# Patient Record
Sex: FEMALE | Race: WHITE | NOT HISPANIC OR LATINO | Employment: PART TIME
[De-identification: names, ages, dates, MRNs, and addresses within clinical notes are randomized per-mention and may not be internally consistent; named-entity substitution may affect disease eponyms.]

---

## 2017-02-27 DIAGNOSIS — F40.243 FEAR OF FLYING: ICD-10-CM

## 2017-02-27 RX ORDER — LORAZEPAM 0.5 MG/1
TABLET ORAL
Qty: 30 TABLET | Refills: 0
Start: 2017-02-27 | End: 2017-09-26

## 2017-02-27 NOTE — TELEPHONE ENCOUNTER
HAs used 30 tabs total in 10+ months. OK for refill. PLease call in RF to pharmacy. Pt last seen in April 2016. Pt to f/u with me this April for annual review blood pressure control, etc. Inform pt

## 2017-02-27 NOTE — TELEPHONE ENCOUNTER
LORazepam (ATIVAN) 0.5 MG tablet      Last Written Prescription Date:  4/29/2016  Last Fill Quantity: 30,   # refills: 0  Last Office Visit with [facility], [facility] or [facility] prescribing provider: 4/29/2016  Future Office visit:       Routing refill request to provider for review/approval because:  Drug not on the [facility], [facility] or [facility] refill protocol or controlled substance

## 2017-02-27 NOTE — LETTER
[facility]  [address] [address]  [phone] (appt)  [phone] (nurse line)    February 28, 2017      Padmini Alan                                                                                                                    [employer] [place] [id number]              Dear Padmini,    In reviewing your recent refill request for Lorazepam, it was noted that you are due for the following:     Follow-up appointment with your physician in April.    Approval for a 30-day supply of the medication has been sent to your pharmacy.  Additional refills will be approved during your follow up visit.     Please contact our office at [phone] to schedule your doctor's appointment.          Sincerely,      [facility]

## 2017-02-27 NOTE — TELEPHONE ENCOUNTER
Reason for Call:  Medication or medication refill:    Do you use a [facility]?  Name of the pharmacy and phone number for the current request:  [facility] [address] - [phone]    Name of the medication requested: Lorazapam    Other request: Please walk to script to [facility]    Can we leave a detailed message on this number? YES    Phone number patient can be reached at: Home number on file [phone] (home)    Best Time: anytime    Call taken on 2/27/2017 at 8:41 AM by PHILIPP GOINS

## 2017-03-28 DIAGNOSIS — Z12.11 SPECIAL SCREENING FOR MALIGNANT NEOPLASMS, COLON: Primary | ICD-10-CM

## 2017-04-21 DIAGNOSIS — I10 ESSENTIAL HYPERTENSION: ICD-10-CM

## 2017-04-21 RX ORDER — LISINOPRIL AND HYDROCHLOROTHIAZIDE 12.5; 2 MG/1; MG/1
TABLET ORAL
Qty: 30 TABLET | Refills: 0 | Status: SHIPPED | OUTPATIENT
Start: 2017-04-21 | End: 2017-06-05

## 2017-04-21 NOTE — TELEPHONE ENCOUNTER
Medication is being filled for 1 time refill only due to:  Patient needs labs K and Cr. Patient needs to be seen because it has almost been more than one year since last visit.

## 2017-04-21 NOTE — TELEPHONE ENCOUNTER
lisinopril-hydrochlorothiazide (PRINZIDE,ZESTORETIC) 20-12.5 MG per tablet      Last Written Prescription Date: 4/29/2016  Last Fill Quantity: 90, # refills: 3  Last Office Visit with [facility], [facility] or [facility] prescribing provider: 4/29/2016       Potassium   Date Value Ref Range Status   04/26/2016 3.7 3.4 - 5.3 mmol/L Final     Creatinine   Date Value Ref Range Status   04/26/2016 1.00 0.52 - 1.04 mg/dL Final     BP Readings from Last 3 Encounters:   08/25/16 116/70   07/18/16 128/88   04/29/16 126/82

## 2017-05-27 ENCOUNTER — HEALTH MAINTENANCE LETTER (OUTPATIENT)
Age: 57
End: 2017-05-27

## 2017-06-01 DIAGNOSIS — E03.9 HYPOTHYROIDISM, UNSPECIFIED TYPE: ICD-10-CM

## 2017-06-01 RX ORDER — LEVOTHYROXINE SODIUM 112 UG/1
TABLET ORAL
Qty: 30 TABLET | Refills: 0 | Status: SHIPPED | OUTPATIENT
Start: 2017-06-01 | End: 2017-06-16

## 2017-06-05 DIAGNOSIS — I10 ESSENTIAL HYPERTENSION: ICD-10-CM

## 2017-06-06 DIAGNOSIS — E03.9 HYPOTHYROIDISM, UNSPECIFIED TYPE: ICD-10-CM

## 2017-06-06 DIAGNOSIS — I10 ESSENTIAL HYPERTENSION: ICD-10-CM

## 2017-06-06 LAB
ANION GAP SERPL CALCULATED.3IONS-SCNC: 9 MMOL/L (ref 3–14)
BUN SERPL-MCNC: 16 MG/DL (ref 7–30)
CALCIUM SERPL-MCNC: 10 MG/DL (ref 8.5–10.1)
CHLORIDE SERPL-SCNC: 107 MMOL/L (ref 94–109)
CO2 SERPL-SCNC: 25 MMOL/L (ref 20–32)
CREAT SERPL-MCNC: 0.91 MG/DL (ref 0.52–1.04)
GFR SERPL CREATININE-BSD FRML MDRD: 63 ML/MIN/1.7M2
GLUCOSE SERPL-MCNC: 99 MG/DL (ref 70–99)
POTASSIUM SERPL-SCNC: 3.9 MMOL/L (ref 3.4–5.3)
SODIUM SERPL-SCNC: 141 MMOL/L (ref 133–144)
TSH SERPL DL<=0.005 MIU/L-ACNC: 2.05 MU/L (ref 0.4–4)

## 2017-06-06 PROCEDURE — 84443 ASSAY THYROID STIM HORMONE: CPT | Performed by: INTERNAL MEDICINE

## 2017-06-06 PROCEDURE — 80048 BASIC METABOLIC PNL TOTAL CA: CPT | Performed by: INTERNAL MEDICINE

## 2017-06-06 PROCEDURE — 36415 COLL VENOUS BLD VENIPUNCTURE: CPT | Performed by: INTERNAL MEDICINE

## 2017-06-06 RX ORDER — LISINOPRIL AND HYDROCHLOROTHIAZIDE 12.5; 2 MG/1; MG/1
TABLET ORAL
Qty: 30 TABLET | Refills: 0 | Status: SHIPPED | OUTPATIENT
Start: 2017-06-06 | End: 2017-07-04

## 2017-06-16 ENCOUNTER — OFFICE VISIT (OUTPATIENT)
Dept: INTERNAL MEDICINE | Facility: CLINIC | Age: 57
End: 2017-06-16
Payer: COMMERCIAL

## 2017-06-16 VITALS
TEMPERATURE: 98.6 F | BODY MASS INDEX: 42.26 KG/M2 | HEART RATE: 97 BPM | SYSTOLIC BLOOD PRESSURE: 118 MMHG | WEIGHT: 252 LBS | DIASTOLIC BLOOD PRESSURE: 70 MMHG | OXYGEN SATURATION: 96 %

## 2017-06-16 DIAGNOSIS — Z12.11 SPECIAL SCREENING FOR MALIGNANT NEOPLASMS, COLON: ICD-10-CM

## 2017-06-16 DIAGNOSIS — E66.01 MORBID OBESITY, UNSPECIFIED OBESITY TYPE (H): ICD-10-CM

## 2017-06-16 DIAGNOSIS — E03.9 HYPOTHYROIDISM, UNSPECIFIED TYPE: ICD-10-CM

## 2017-06-16 PROCEDURE — 99214 OFFICE O/P EST MOD 30 MIN: CPT | Performed by: INTERNAL MEDICINE

## 2017-06-16 RX ORDER — LEVOTHYROXINE SODIUM 112 UG/1
TABLET ORAL
Qty: 30 TABLET | Refills: 6
Start: 2017-06-16 | End: 2017-06-27

## 2017-06-16 RX ORDER — LEVOTHYROXINE SODIUM 125 UG/1
TABLET ORAL
Qty: 30 TABLET | Refills: 6
Start: 2017-06-16 | End: 2017-08-25

## 2017-06-16 NOTE — NURSING NOTE
"Chief Complaint   Patient presents with     Thyroid Problem       Initial /70  Pulse 97  Temp 98.6  F (37  C) (Oral)  Wt 252 lb (114.3 kg)  LMP 12/23/2004  SpO2 96%  BMI 42.26 kg/m2 Estimated body mass index is 42.26 kg/(m^2) as calculated from the following:    Height as of 4/29/16: 5' 4.75\" (1.645 m).    Weight as of this encounter: 252 lb (114.3 kg).  Medication Reconciliation: complete  "

## 2017-06-16 NOTE — PATIENT INSTRUCTIONS
" FIT test  Call when willing to have the colonoscopy  Pap/pelvic exam with me or female staff  Mammogram - [place]  Change Levothyroxine to 112mcg  on odd days and 125mcg on even days  Nonfasting TSH lab in 6 weeks (early August)  Calorie/carbohydrate (sugar, bread, potato, pasta, etc) reduction in diet for weight loss.  Increase color on your plate with fruits and vegetables. Increase  frequency of walking or other aerobic exercise as able (goal is daily). \"Couch aerobics\"  "

## 2017-06-16 NOTE — PROGRESS NOTES
"  SUBJECTIVE:                                                    Padmini Alan is a 57 year old female who presents to clinic today for the following health issues:      Hypothyroidism Follow-up      Since last visit, patient describes the following symptoms: weight gain of 12 lbs and fatigue       Amount of exercise or physical activity: 4-5 days/week for an average of 15-30 minutes    Problems taking medications regularly: No    Medication side effects: none    Diet: regular (no restrictions)      Pt's past medical history, family history, habits, medications and allergies were reviewed with the patient today.  See snap shot for  HCM status. Most recent lab results reviewed with pt. Problem list and histories reviewed & adjusted, as indicated.  Additional history as below:    Has used Lorazepam very rarely with travel, etc.   patient is not exercising currently. Not following any particular type of diet. Has gained 12 pounds since last August. Thyroid levels reviewed and TSH in the middle of normal. Currently on levothyroxine 112  g daily. When he has tried 125  g daily, TSH has been suppressed on lab values. Denies chest pain, shortness of breath, abdominal pain, headache, vision changes or side effects with medications.       Additional ROS:   Constitutional, HEENT, Cardiovascular, Pulmonary, GI and , Neuro, MSK and Psych review of systems/symptoms are otherwise negative or unchanged from previous, except as noted above.      OBJECTIVE:  /70  Pulse 97  Temp 98.6  F (37  C) (Oral)  Wt 252 lb (114.3 kg)  LMP 12/23/2004  SpO2 96%  BMI 42.26 kg/m2   Estimated body mass index is 42.26 kg/(m^2) as calculated from the following:    Height as of 4/29/16: 5' 4.75\" (1.645 m).    Weight as of this encounter: 252 lb (114.3 kg).  Eye: PERRL, EOMI  HENT: ear canals and TM's normal and nose and mouth without ulcers or lesions   Neck: no adenopathy. Thyroid normal to palpation. No bruits  Pulm: Lungs clear to " "auscultation   CV: Regular rates and rhythm  GI: Soft, obese, nontender, Normal active bowel sounds, No hepatosplenomegaly or masses palpable  Ext: Peripheral pulses intact. No edema.  Neuro: Normal strength and tone, sensory exam grossly normal    Assessment/Plan: (See plan discussion below for further details)  1. Hypothyroidism, unspecified type  Will try to tweak up the dose a little bit more to help with metabolism along with lifestyle changes as below to help patient's weight etc.  - TSH with free T4 reflex; Future  - levothyroxine (SYNTHROID/LEVOTHROID) 112 MCG tablet; 1 tab every other day on odd  number days  Dispense: 30 tablet; Refill: 6  - levothyroxine (SYNTHROID/LEVOTHROID) 125 MCG tablet; 1 tab every other day on even number days  Dispense: 30 tablet; Refill: 6    2. Morbid obesity, unspecified obesity type (H)  Calorie/carbohydrate (sugar, bread, potato, pasta, etc) reduction in diet for weight loss.  Increase color on your plate with fruits and vegetables. Increase  frequency of walking or other aerobic exercise as able (goal is daily)    3. Special screening for malignant neoplasms, colon  Refuses colonoscopy despite MD recommendation for the procedure. FIT test ordered. Reviewed the reduced sensitivity of the FIT test for colon cancer screening compared to colonoscopy and pt states understanding of this      Plan discussion:   FIT test  Call when willing to have the colonoscopy  Pap/pelvic exam with me or female staff  Mammogram - [place]  Change Levothyroxine to 112mcg  on odd days and 125mcg on even days  Nonfasting TSH lab in 6 weeks (early August)  Calorie/carbohydrate (sugar, bread, potato, pasta, etc) reduction in diet for weight loss.  Increase color on your plate with fruits and vegetables. Increase  frequency of walking or other aerobic exercise as able (goal is daily). \"Couch aerobics\"       >25 minutes was spent with the patient today with more than 50% of the appointment providing " counseling/education re: obesity/diet & exercise ideas and benefits of cancer screenings as above and coordination of care           Maico Bell MD  Internal Medicine Department  [facility]

## 2017-06-16 NOTE — MR AVS SNAPSHOT
"              After Visit Summary   6/16/2017    Padmini Alan    MRN: [id number]           Patient Information     Date Of Birth          1960        Visit Information        Provider Department      6/16/2017 2:30 PM Maico Bell MD [facility]        Today's Diagnoses     Special screening for malignant neoplasms, colon    -  1    Hypothyroidism, unspecified type          Care Instructions     FIT test  Call when willing to have the colonoscopy  Pap/pelvic exam with me or female staff  Mammogram - [facility]  Change Levothyroxine to 112mcg  on odd days and 125mcg on even days  Nonfasting TSH lab in 6 weeks (early August)  Calorie/carbohydrate (sugar, bread, potato, pasta, etc) reduction in diet for weight loss.  Increase color on your plate with fruits and vegetables. Increase  frequency of walking or other aerobic exercise as able (goal is daily). \"Couch aerobics\"          Follow-ups after your visit        Future tests that were ordered for you today     Open Future Orders        Priority Expected Expires Ordered    TSH with free T4 reflex Routine 7/28/2017 6/16/2018 6/16/2017            Who to contact     If you have questions or need follow up information about today's clinic visit or your schedule please contact [facility] directly at [phone].  Normal or non-critical lab and imaging results will be communicated to you by MyChart, letter or phone within 4 business days after the clinic has received the results. If you do not hear from us within 7 days, please contact the clinic through MyChart or phone. If you have a critical or abnormal lab result, we will notify you by phone as soon as possible.  Submit refill requests through [employer] or call your pharmacy and they will forward the refill request to us. Please allow 3 business days for your refill to be completed.          Additional Information About Your Visit        MyChart Information     MyChart " "lets you send messages to your doctor, view your test results, renew your prescriptions, schedule appointments and more. To sign up, go to [email or web address] . Click on \"Log in\" on the left side of the screen, which will take you to the Welcome page. Then click on \"Sign up Now\" on the right side of the page.     You will be asked to enter the access code listed below, as well as some personal information. Please follow the directions to create your username and password.     Your access code is: [email or web address]  Expires: 2017  3:18 PM     Your access code will  in 90 days. If you need help or a new code, please call your [facility] clinic or [phone].        Care EveryWhere ID     This is your Care EveryWhere ID. This could be used by other organizations to access your [facility] medical records  [id number]        Your Vitals Were     Pulse Temperature Last Period Pulse Oximetry BMI (Body Mass Index)       97 98.6  F (37  C) (Oral) 2004 96% 42.26 kg/m2        Blood Pressure from Last 3 Encounters:   17 118/70   16 116/70   16 128/88    Weight from Last 3 Encounters:   17 252 lb (114.3 kg)   16 240 lb (108.9 kg)   16 239 lb 14.4 oz (108.8 kg)               Primary Care Provider Office Phone # Fax #    Maico Bell [phone]       [facility] [address] [facility] [address]        Thank you!     Thank you for choosing [facility]  for your care. Our goal is always to provide you with excellent care. Hearing back from our patients is one way we can continue to improve our services. Please take a few minutes to complete the written survey that you may receive in the mail after your visit with us. Thank you!             Your Updated Medication List - Protect others around you: Learn how to safely use, store and throw away your medicines at www.disposemymeds.org.          This list is accurate as of: 17  " 3:18 PM.  Always use your most recent med list.                   Brand Name Dispense Instructions for use    fluticasone 50 MCG/ACT spray    FLONASE    16 g    Spray 1-2 sprays into both nostrils daily       levothyroxine 112 MCG tablet    SYNTHROID/LEVOTHROID    30 tablet    TAKE 1 TABLET (112 MCG) BY MOUTH DAILY       lisinopril-hydrochlorothiazide 20-12.5 MG per tablet    PRINZIDE/ZESTORETIC    30 tablet    TAKE ONE TABLET BY MOUTH IN THE MORNING       LORazepam 0.5 MG tablet    ATIVAN    30 tablet    1 tab twice a day as needed for anxiety

## 2017-06-27 DIAGNOSIS — E03.9 HYPOTHYROIDISM, UNSPECIFIED TYPE: ICD-10-CM

## 2017-06-27 RX ORDER — LEVOTHYROXINE SODIUM 112 UG/1
TABLET ORAL
Qty: 30 TABLET | Refills: 0 | OUTPATIENT
Start: 2017-06-27

## 2017-06-27 RX ORDER — LEVOTHYROXINE SODIUM 112 UG/1
TABLET ORAL
Qty: 30 TABLET | Refills: 11 | Status: SHIPPED | OUTPATIENT
Start: 2017-06-27 | End: 2018-08-09

## 2017-07-04 DIAGNOSIS — I10 ESSENTIAL HYPERTENSION: ICD-10-CM

## 2017-07-06 RX ORDER — LISINOPRIL AND HYDROCHLOROTHIAZIDE 12.5; 2 MG/1; MG/1
TABLET ORAL
Qty: 30 TABLET | Refills: 10 | Status: SHIPPED | OUTPATIENT
Start: 2017-07-06 | End: 2018-05-02

## 2017-07-06 NOTE — TELEPHONE ENCOUNTER
Prescription approved per [facility] Refill Protocol.

## 2017-08-04 ENCOUNTER — TELEPHONE (OUTPATIENT)
Dept: INTERNAL MEDICINE | Facility: CLINIC | Age: 57
End: 2017-08-04

## 2017-08-05 NOTE — TELEPHONE ENCOUNTER
8/4/2017    Call Regarding Preventive Health Screening Colonoscopy, Mammogram and Cervical/PAP    Attempt 1    Message on voicemail     Comments:       Outreach   Mireya Goldstein

## 2017-08-09 DIAGNOSIS — E03.9 HYPOTHYROIDISM, UNSPECIFIED TYPE: ICD-10-CM

## 2017-08-09 DIAGNOSIS — I10 ESSENTIAL HYPERTENSION: Primary | ICD-10-CM

## 2017-08-09 LAB — TSH SERPL DL<=0.005 MIU/L-ACNC: 0.9 MU/L (ref 0.4–4)

## 2017-08-09 PROCEDURE — 84443 ASSAY THYROID STIM HORMONE: CPT | Performed by: INTERNAL MEDICINE

## 2017-08-09 PROCEDURE — 36415 COLL VENOUS BLD VENIPUNCTURE: CPT | Performed by: INTERNAL MEDICINE

## 2017-08-21 NOTE — TELEPHONE ENCOUNTER
8/21/2017    Call Regarding Preventive Health Screening VIP Mammogram    Attempt 1    Message on voicemail     Comments: VIP Mammogram Patient    Other screenings due: PAP, COLONOSCOPY        Outreach   [facility]

## 2017-08-25 DIAGNOSIS — E03.9 HYPOTHYROIDISM, UNSPECIFIED TYPE: ICD-10-CM

## 2017-08-25 NOTE — TELEPHONE ENCOUNTER
Levothyroxine rx no print out, don't think pharmacy got it     Last Written Prescription Date: 06/16/17  Last Quantity: 30, # refills: 6  Last Office Visit with [facility], [facility] or [facility] prescribing provider: 06/16/17        TSH   Date Value Ref Range Status   08/09/2017 0.90 0.40 - 4.00 mU/L Final

## 2017-08-29 RX ORDER — LEVOTHYROXINE SODIUM 125 UG/1
TABLET ORAL
Qty: 90 TABLET | Refills: 2 | Status: SHIPPED | OUTPATIENT
Start: 2017-08-29 | End: 2018-05-18

## 2017-08-31 NOTE — TELEPHONE ENCOUNTER
8/31/2017    Call Regarding Preventive Health Screening VIP Mammogram    Attempt 1    Message on voicemail     Comments: VIP Mammogram Patient    Other screenings due: PAP, COLONOSCOPY        Outreach   AT

## 2017-09-26 DIAGNOSIS — F40.243 FEAR OF FLYING: ICD-10-CM

## 2017-09-26 NOTE — TELEPHONE ENCOUNTER
Lorazepam      Last Written Prescription Date:  02/27/17  Last Fill Quantity: 30,   # refills: 0  Last Office Visit with [facility], [facility] or [facility] prescribing provider: 06/16/17  Future Office visit:       Routing refill request to provider for review/approval because:  Drug is a controlled substance

## 2017-09-27 RX ORDER — LORAZEPAM 0.5 MG/1
TABLET ORAL
Qty: 30 TABLET | Refills: 0 | Status: SHIPPED | OUTPATIENT
Start: 2017-09-27 | End: 2018-03-27

## 2017-09-27 NOTE — TELEPHONE ENCOUNTER
Has used 30 tabs in 7 mos. Use appropriate. Printed Rx in [place] basket. Please fax or call into pharmacy

## 2018-03-27 DIAGNOSIS — F40.243 FEAR OF FLYING: ICD-10-CM

## 2018-03-27 RX ORDER — LORAZEPAM 0.5 MG/1
TABLET ORAL
Qty: 30 TABLET | Refills: 0 | Status: SHIPPED | OUTPATIENT
Start: 2018-03-27 | End: 2018-08-09

## 2018-03-27 NOTE — TELEPHONE ENCOUNTER
Requested Prescriptions   Pending Prescriptions Disp Refills     LORazepam (ATIVAN) 0.5 MG tablet 30 tablet 0     Si tab twice a day as needed for anxiety    There is no refill protocol information for this order        Last Written Prescription Date:  17  Last Fill Quantity: 30,  # refills: 0   Last office visit: 2017 with prescribing provider:  Ray Minaya Office Visit:

## 2018-05-02 DIAGNOSIS — I10 ESSENTIAL HYPERTENSION: ICD-10-CM

## 2018-05-02 RX ORDER — LISINOPRIL AND HYDROCHLOROTHIAZIDE 12.5; 2 MG/1; MG/1
TABLET ORAL
Qty: 30 TABLET | Refills: 0 | Status: SHIPPED | OUTPATIENT
Start: 2018-05-02 | End: 2018-05-31

## 2018-05-02 NOTE — TELEPHONE ENCOUNTER
"Requested Prescriptions   Pending Prescriptions Disp Refills     lisinopril-hydrochlorothiazide (PRINZIDE/ZESTORETIC) 20-12.5 MG per tablet [Pharmacy Med Name: LISINOPRIL-HCTZ 20-12.5 MG TAB] 30 tablet 10     Sig: TAKE ONE TABLET BY MOUTH IN THE MORNING    Diuretics (Including Combos) Protocol Passed    5/2/2018  1:40 AM       Passed - Blood pressure under 140/90 in past 12 months    BP Readings from Last 3 Encounters:   06/16/17 118/70   08/25/16 116/70   07/18/16 128/88                Passed - Recent (12 mo) or future (30 days) visit within the authorizing provider's specialty    Patient had office visit in the last 12 months or has a visit in the next 30 days with authorizing provider or within the authorizing provider's specialty.  See \"Patient Info\" tab in inbasket, or \"Choose Columns\" in Meds & Orders section of the refill encounter.           Passed - Patient is age 18 or older       Passed - No active pregancy on record       Passed - Normal serum creatinine on file in past 12 months    Recent Labs   Lab Test  06/06/17   0958   CR  0.91             Passed - Normal serum potassium on file in past 12 months    Recent Labs   Lab Test  06/06/17   0958   POTASSIUM  3.9                   Passed - Normal serum sodium on file in past 12 months    Recent Labs   Lab Test  06/06/17   0958   NA  141             Passed - No positive pregnancy test in past 12 months        Prescription approved per [facility] Refill Protocol.    "

## 2018-05-18 DIAGNOSIS — E03.9 HYPOTHYROIDISM, UNSPECIFIED TYPE: ICD-10-CM

## 2018-05-18 NOTE — TELEPHONE ENCOUNTER
"Requested Prescriptions   Pending Prescriptions Disp Refills     levothyroxine (SYNTHROID/LEVOTHROID) 125 MCG tablet [Pharmacy Med Name: LEVOTHYROXINE 125 MCG TABLET]  Last Written Prescription Date:  8/29/2017  Last Fill Quantity: 90 tablet,  # refills: 2   Last Office Visit: 6/16/2017   Future Office Visit:      90 tablet 2     Sig: TAKE ONE TABLET BY MOUTH ONE TIME DAILY    Thyroid Protocol Passed    5/18/2018 12:58 AM       Passed - Patient is 12 years or older       Passed - Recent (12 mo) or future (30 days) visit within the authorizing provider's specialty    Patient had office visit in the last 12 months or has a visit in the next 30 days with authorizing provider or within the authorizing provider's specialty.  See \"Patient Info\" tab in inbasket, or \"Choose Columns\" in Meds & Orders section of the refill encounter.           Passed - Normal TSH on file in past 12 months    Recent Labs   Lab Test  08/09/17   1331   TSH  0.90             Passed - No active pregnancy on record    If patient is pregnant or has had a positive pregnancy test, please check TSH.         Passed - No positive pregnancy test in past 12 months    If patient is pregnant or has had a positive pregnancy test, please check TSH.            "

## 2018-05-18 NOTE — TELEPHONE ENCOUNTER
"Requested Prescriptions   Pending Prescriptions Disp Refills     levothyroxine (SYNTHROID/LEVOTHROID) 125 MCG tablet [Pharmacy Med Name: LEVOTHYROXINE 125 MCG TABLET]  Last Written Prescription Date:  8/29/2017  Last Fill Quantity: 90,  # refills: 2   Last office visit: 6/16/2017 with prescribing provider:  6/16/2017   Future Office Visit:     90 tablet 2     Sig: TAKE ONE TABLET BY MOUTH ONE TIME DAILY    Thyroid Protocol Passed    5/18/2018 12:58 AM       Passed - Patient is 12 years or older       Passed - Recent (12 mo) or future (30 days) visit within the authorizing provider's specialty    Patient had office visit in the last 12 months or has a visit in the next 30 days with authorizing provider or within the authorizing provider's specialty.  See \"Patient Info\" tab in inbasket, or \"Choose Columns\" in Meds & Orders section of the refill encounter.           Passed - Normal TSH on file in past 12 months    Recent Labs   Lab Test  08/09/17   1331   TSH  0.90             Passed - No active pregnancy on record    If patient is pregnant or has had a positive pregnancy test, please check TSH.         Passed - No positive pregnancy test in past 12 months    If patient is pregnant or has had a positive pregnancy test, please check TSH.            "

## 2018-05-21 RX ORDER — LEVOTHYROXINE SODIUM 125 UG/1
TABLET ORAL
Qty: 90 TABLET | Refills: 0 | Status: SHIPPED | OUTPATIENT
Start: 2018-05-21 | End: 2018-08-09

## 2018-05-31 DIAGNOSIS — I10 ESSENTIAL HYPERTENSION: ICD-10-CM

## 2018-05-31 RX ORDER — LISINOPRIL AND HYDROCHLOROTHIAZIDE 12.5; 2 MG/1; MG/1
TABLET ORAL
Qty: 30 TABLET | Refills: 0 | Status: SHIPPED | OUTPATIENT
Start: 2018-05-31 | End: 2018-07-29

## 2018-05-31 NOTE — TELEPHONE ENCOUNTER
"Requested Prescriptions   Pending Prescriptions Disp Refills     lisinopril-hydrochlorothiazide (PRINZIDE/ZESTORETIC) 20-12.5 MG per tablet [Pharmacy Med Name: LISINOPRIL-HCTZ 20-12.5 MG TAB] 30 tablet 0     Sig: TAKE ONE TABLET BY MOUTH IN THE MORNING    Diuretics (Including Combos) Protocol Passed    5/31/2018  1:29 AM       Passed - Blood pressure under 140/90 in past 12 months    BP Readings from Last 3 Encounters:   06/16/17 118/70   08/25/16 116/70   07/18/16 128/88                Passed - Recent (12 mo) or future (30 days) visit within the authorizing provider's specialty    Patient had office visit in the last 12 months or has a visit in the next 30 days with authorizing provider or within the authorizing provider's specialty.  See \"Patient Info\" tab in inbasket, or \"Choose Columns\" in Meds & Orders section of the refill encounter.           Passed - Patient is age 18 or older       Passed - No active pregancy on record       Passed - Normal serum creatinine on file in past 12 months    Recent Labs   Lab Test  06/06/17   0958   CR  0.91             Passed - Normal serum potassium on file in past 12 months    Recent Labs   Lab Test  06/06/17   0958   POTASSIUM  3.9                   Passed - Normal serum sodium on file in past 12 months    Recent Labs   Lab Test  06/06/17   0958   NA  141             Passed - No positive pregnancy test in past 12 months        Prescription approved per [facility] Refill Protocol.    "

## 2018-07-29 DIAGNOSIS — I10 ESSENTIAL HYPERTENSION: ICD-10-CM

## 2018-07-29 NOTE — LETTER
[facility]  [address] [id number] [phone]            Padmini Alan  [facility] [facility] [address] [facility] [address]        July 31, 2018    Dear Padmini,    While refilling your prescription today, we noticed that you are due for an appointment with your provider.  We will refill your prescription for 30 days, but a follow-up appointment must be made before any additional refills can be approved.     Taking care of your health is important to us and we look forward to seeing you in the near future.  Please call us at [phone] or [phone] (or use [employer]) to schedule an appointment.     Please disregard this notice if you have already made an appointment.    Sincerely,        [facility]

## 2018-07-31 RX ORDER — LISINOPRIL AND HYDROCHLOROTHIAZIDE 12.5; 2 MG/1; MG/1
TABLET ORAL
Qty: 30 TABLET | Refills: 0 | Status: SHIPPED | OUTPATIENT
Start: 2018-07-31 | End: 2018-08-09

## 2018-07-31 NOTE — TELEPHONE ENCOUNTER
"Requested Prescriptions   Pending Prescriptions Disp Refills     lisinopril-hydrochlorothiazide (PRINZIDE/ZESTORETIC) 20-12.5 MG per tablet [Pharmacy Med Name: LISINOPRIL-HCTZ 20-12.5 MG TAB] 30 tablet 0     Sig: TAKE ONE TABLET BY MOUTH IN THE MORNING    Diuretics (Including Combos) Protocol Failed    7/29/2018  1:25 AM       Failed - Blood pressure under 140/90 in past 12 months    BP Readings from Last 3 Encounters:   06/16/17 118/70   08/25/16 116/70   07/18/16 128/88                Failed - Recent (12 mo) or future (30 days) visit within the authorizing provider's specialty    Patient had office visit in the last 12 months or has a visit in the next 30 days with authorizing provider or within the authorizing provider's specialty.  See \"Patient Info\" tab in inbasket, or \"Choose Columns\" in Meds & Orders section of the refill encounter.           Failed - Normal serum creatinine on file in past 12 months    Recent Labs   Lab Test  06/06/17   0958   CR  0.91             Failed - Normal serum potassium on file in past 12 months    Recent Labs   Lab Test  06/06/17   0958   POTASSIUM  3.9                   Failed - Normal serum sodium on file in past 12 months    Recent Labs   Lab Test  06/06/17   0958   NA  141             Passed - Patient is age 18 or older       Passed - No active pregancy on record       Passed - No positive pregnancy test in past 12 months        Last Written Prescription Date:  05/31/2018  Last Fill Quantity: 30,  # refills: 0   Last office visit: 6/16/2017 with prescribing provider:  06/16/2017   Future Office Visit:      "

## 2018-08-03 DIAGNOSIS — I10 ESSENTIAL HYPERTENSION: ICD-10-CM

## 2018-08-03 DIAGNOSIS — E03.9 HYPOTHYROIDISM, UNSPECIFIED TYPE: ICD-10-CM

## 2018-08-03 LAB
ANION GAP SERPL CALCULATED.3IONS-SCNC: 8 MMOL/L (ref 3–14)
BUN SERPL-MCNC: 13 MG/DL (ref 7–30)
CALCIUM SERPL-MCNC: 9.5 MG/DL (ref 8.5–10.1)
CHLORIDE SERPL-SCNC: 107 MMOL/L (ref 94–109)
CO2 SERPL-SCNC: 26 MMOL/L (ref 20–32)
CREAT SERPL-MCNC: 1.02 MG/DL (ref 0.52–1.04)
GFR SERPL CREATININE-BSD FRML MDRD: 55 ML/MIN/1.7M2
GLUCOSE SERPL-MCNC: 82 MG/DL (ref 70–99)
POTASSIUM SERPL-SCNC: 3.9 MMOL/L (ref 3.4–5.3)
SODIUM SERPL-SCNC: 141 MMOL/L (ref 133–144)
TSH SERPL DL<=0.005 MIU/L-ACNC: 3.15 MU/L (ref 0.4–4)

## 2018-08-03 PROCEDURE — 84443 ASSAY THYROID STIM HORMONE: CPT | Performed by: INTERNAL MEDICINE

## 2018-08-03 PROCEDURE — 36415 COLL VENOUS BLD VENIPUNCTURE: CPT | Performed by: INTERNAL MEDICINE

## 2018-08-03 PROCEDURE — 80048 BASIC METABOLIC PNL TOTAL CA: CPT | Performed by: INTERNAL MEDICINE

## 2018-08-03 RX ORDER — LISINOPRIL AND HYDROCHLOROTHIAZIDE 12.5; 2 MG/1; MG/1
TABLET ORAL
Qty: 30 TABLET | Refills: 0 | OUTPATIENT
Start: 2018-08-03

## 2018-08-03 RX ORDER — LISINOPRIL AND HYDROCHLOROTHIAZIDE 12.5; 2 MG/1; MG/1
TABLET ORAL
Qty: 30 TABLET | Refills: 0 | Status: SHIPPED | OUTPATIENT
Start: 2018-08-03 | End: 2018-08-07

## 2018-08-03 NOTE — TELEPHONE ENCOUNTER
"Requested Prescriptions   Pending Prescriptions Disp Refills     lisinopril-hydrochlorothiazide (PRINZIDE/ZESTORETIC) 20-12.5 MG per tablet [Pharmacy Med Name: LISINOPRIL-HCTZ 20-12.5 MG TAB] 30 tablet 0    Last Written Prescription Date:  7/31/18  Last Fill Quantity: 30,  # refills: 0   Last office visit: 6/16/2017 with prescribing provider:  6/16/17   Future Office Visit:   Next 5 appointments (look out 90 days)     Aug 09, 2018  3:30 PM CDT   Office Visit with Maico Bell MD   [facility]    [address] [address] [phone]                  Sig: TAKE ONE TABLET BY MOUTH IN THE MORNING    Diuretics (Including Combos) Protocol Failed    8/3/2018  9:58 AM       Failed - Blood pressure under 140/90 in past 12 months    BP Readings from Last 3 Encounters:   06/16/17 118/70   08/25/16 116/70   07/18/16 128/88                Failed - Normal serum creatinine on file in past 12 months    Recent Labs   Lab Test  06/06/17   0958   CR  0.91             Failed - Normal serum potassium on file in past 12 months    Recent Labs   Lab Test  06/06/17   0958   POTASSIUM  3.9                   Failed - Normal serum sodium on file in past 12 months    Recent Labs   Lab Test  06/06/17   0958   NA  141             Passed - Recent (12 mo) or future (30 days) visit within the authorizing provider's specialty    Patient had office visit in the last 12 months or has a visit in the next 30 days with authorizing provider or within the authorizing provider's specialty.  See \"Patient Info\" tab in inbasket, or \"Choose Columns\" in Meds & Orders section of the refill encounter.           Passed - Patient is age 18 or older       Passed - No active pregancy on record       Passed - No positive pregnancy test in past 12 months          "

## 2018-08-07 NOTE — PROGRESS NOTES
"  SUBJECTIVE:   Padmini Alan is a 58 year old female who presents to clinic today for the following health issues:    Chief Complaint   Patient presents with     Hypertension     Thyroid Check       Hypertension Follow-up      Outpatient blood pressures are not being checked.    Low Salt Diet: low salt    Hypothyroidism Follow-up      Since last visit, patient describes the following symptoms: Weight stable, no hair loss, no skin changes, no constipation, no loose stools      Amount of exercise or physical activity: 4-5 days/week for an average of greater than 60 minutes    Problems taking medications regularly: No    Medication side effects: none    Diet: low salt      Pt's past medical history, family history, habits, medications and allergies were reviewed with the patient today.  See snap shot for  HCM status. Most recent lab results reviewed with pt. Problem list and histories reviewed & adjusted, as indicated.  Additional history as below:    [employer] (old [employer]) started 1 month ago. Has lost 8 pounds so far. Denies chest pain, shortness of breath, abdominal pain, headache, vision changes or side effects with medications.  Due for multiple cancer screenings  Patient is retired and will be doing more traveling with her .  History of fear with flying.  Has used a total of 30 lorazepam tablets in the last 5 months.  Mood overall doing well.  Requests refill of lorazepam  For travel with flying  and rare episodes of increased anxiety       Additional ROS:   Constitutional, HEENT, Cardiovascular, Pulmonary, GI and , Neuro, MSK and Psych review of systems/symptoms are otherwise negative or unchanged from previous, except as noted above.      OBJECTIVE:  /70  Pulse 88  Temp 98.3  F (36.8  C) (Oral)  Resp 16  Ht 5' 3\" (1.6 m)  Wt 244 lb (110.7 kg)  LMP 12/23/2004  SpO2 97%  BMI 43.22 kg/m2   Estimated body mass index is 43.22 kg/(m^2) as calculated from the following:    " "Height as of this encounter: 5' 3\" (1.6 m).    Weight as of this encounter: 244 lb (110.7 kg).  Eye: PERRL, EOMI  HENT: ear canals and TM's normal and nose and mouth without ulcers or lesions   Neck: no adenopathy. Thyroid normal to palpation. No bruits  Pulm: Lungs clear to auscultation   CV: Regular rates and rhythm  GI: Soft, obese, nontender, Normal active bowel sounds, No hepatosplenomegaly or masses palpable  Ext: Peripheral pulses intact. No edema.  Neuro: Normal strength and tone, sensory exam grossly normal  Gen: Calm affect today    Assessment/Plan: (See plan discussion below for further details)  1. Essential hypertension  Controlled.  Continue current medication  - lisinopril-hydrochlorothiazide (PRINZIDE/ZESTORETIC) 20-12.5 MG per tablet; TAKE ONE TABLET BY MOUTH IN THE MORNING  Dispense: 90 tablet; Refill: 3    2. Hypothyroidism, unspecified type  Controlled.  Continue current medication.  Repeat lab one year  - levothyroxine (SYNTHROID/LEVOTHROID) 125 MCG tablet; 1 tab  every other day (even days)  Dispense: 45 tablet; Refill: 3  - levothyroxine (SYNTHROID/LEVOTHROID) 112 MCG tablet; 1 tab every other day (odd days)  Dispense: 45 tablet; Refill: 3  - TSH with free T4 reflex; Future    3. Morbid obesity, unspecified obesity type (H)  See counseling below.  Contributing to hypertension, etc.    4. Fear of flying  Rare use of lorazepam.  Refill done  - LORazepam (ATIVAN) 0.5 MG tablet; 1 tab twice a day as needed for anxiety  Dispense: 30 tablet; Refill: 0    5. Screen for colon cancer  Patient declines FIT stool testing and colonoscopy at this time.  Strongly encouraged patient to have colonoscopy and she will inform physician when willing    6. Visit for screening mammogram  Encouraged patient had a mammogram.  Declines at this time but will consider     7. Screening for malignant neoplasm of cervix  Encourage patient to have a Pap and pelvic examination with myself or gynecology.  Patient states she " will think about it and schedule      Plan discussion:  Continue current meds  Prescriptions refilled.    Call  [phone] or use [employer] to schedule a future lab appointment  fasting in 1 year.   Follow up with me a few days after these future labs are drawn to review results and other medical issues as necessary  Contact clinic if willing to have mammogram or colon cancer screening/colonoscopy  Schedule appt with me or GYN for pap/pelvic exam  Continue recently started diet for further weight loss  Increase  frequency of walking or other aerobic exercise as able (goal is daily)              Maico Bell MD  Internal Medicine Department  [facility]

## 2018-08-09 ENCOUNTER — OFFICE VISIT (OUTPATIENT)
Dept: INTERNAL MEDICINE | Facility: CLINIC | Age: 58
End: 2018-08-09
Payer: COMMERCIAL

## 2018-08-09 VITALS
RESPIRATION RATE: 16 BRPM | WEIGHT: 244 LBS | TEMPERATURE: 98.3 F | OXYGEN SATURATION: 97 % | BODY MASS INDEX: 43.23 KG/M2 | HEIGHT: 63 IN | HEART RATE: 88 BPM | SYSTOLIC BLOOD PRESSURE: 120 MMHG | DIASTOLIC BLOOD PRESSURE: 70 MMHG

## 2018-08-09 DIAGNOSIS — I10 ESSENTIAL HYPERTENSION: Primary | ICD-10-CM

## 2018-08-09 DIAGNOSIS — Z12.4 SCREENING FOR MALIGNANT NEOPLASM OF CERVIX: ICD-10-CM

## 2018-08-09 DIAGNOSIS — E03.9 HYPOTHYROIDISM, UNSPECIFIED TYPE: ICD-10-CM

## 2018-08-09 DIAGNOSIS — E66.01 MORBID OBESITY, UNSPECIFIED OBESITY TYPE (H): ICD-10-CM

## 2018-08-09 DIAGNOSIS — F40.243 FEAR OF FLYING: ICD-10-CM

## 2018-08-09 DIAGNOSIS — Z12.31 VISIT FOR SCREENING MAMMOGRAM: ICD-10-CM

## 2018-08-09 DIAGNOSIS — Z12.11 SCREEN FOR COLON CANCER: ICD-10-CM

## 2018-08-09 PROCEDURE — 99214 OFFICE O/P EST MOD 30 MIN: CPT | Performed by: INTERNAL MEDICINE

## 2018-08-09 RX ORDER — LEVOTHYROXINE SODIUM 112 UG/1
TABLET ORAL
Qty: 45 TABLET | Refills: 3 | Status: SHIPPED | OUTPATIENT
Start: 2018-08-09 | End: 2019-10-19

## 2018-08-09 RX ORDER — LORAZEPAM 0.5 MG/1
TABLET ORAL
Qty: 30 TABLET | Refills: 0 | Status: SHIPPED | OUTPATIENT
Start: 2018-08-09 | End: 2019-01-04

## 2018-08-09 RX ORDER — LEVOTHYROXINE SODIUM 112 UG/1
TABLET ORAL
Qty: 30 TABLET | Refills: 11 | Status: SHIPPED | OUTPATIENT
Start: 2018-08-09 | End: 2018-08-09

## 2018-08-09 RX ORDER — LEVOTHYROXINE SODIUM 125 UG/1
TABLET ORAL
Qty: 45 TABLET | Refills: 3 | Status: SHIPPED | OUTPATIENT
Start: 2018-08-09 | End: 2019-07-20

## 2018-08-09 RX ORDER — LISINOPRIL AND HYDROCHLOROTHIAZIDE 12.5; 2 MG/1; MG/1
TABLET ORAL
Qty: 90 TABLET | Refills: 3 | Status: SHIPPED | OUTPATIENT
Start: 2018-08-09 | End: 2019-08-05

## 2018-08-09 NOTE — MR AVS SNAPSHOT
After Visit Summary   8/9/2018    Padmini Alan    MRN: [id number]           Patient Information     Date Of Birth          1960        Visit Information        Provider Department      8/9/2018 3:30 PM Maico Bell MD [facility]        Today's Diagnoses     Hypothyroidism, unspecified type        Essential hypertension        Fear of flying        Screen for colon cancer        Visit for screening mammogram        Screening for malignant neoplasm of cervix        Screening for HIV (human immunodeficiency virus)          Care Instructions    Continue current meds  Prescriptions refilled.    Call  [phone] or use SearchMe to schedule a future lab appointment  fasting in 1 year.   Follow up with me a few days after these future labs are drawn to review results and other medical issues as necessary  Contact clinic if willing to have mammogram or colon cancer screening /colonoscopy  Schedule appt with me or GYN for pap/pelvic exam                 Follow-ups after your visit        Who to contact     If you have questions or need follow up information about today's clinic visit or your schedule please contact [facility] directly at [phone].  Normal or non-critical lab and imaging results will be communicated to you by [employer], letter or phone within 4 business days after the clinic has received the results. If you do not hear from us within 7 days, please contact the clinic through [employer] or phone. If you have a critical or abnormal lab result, we will notify you by phone as soon as possible.  Submit refill requests through [employer] or call your pharmacy and they will forward the refill request to us. Please allow 3 business days for your refill to be completed.          Additional Information About Your Visit        Buddy Drinkshart Information     [employer] lets you send messages to your doctor, view your test results, renew your prescriptions,  "schedule appointments and more. To sign up, go to [email or web address] . Click on \"Log in\" on the left side of the screen, which will take you to the Welcome page. Then click on \"Sign up Now\" on the right side of the page.     You will be asked to enter the access code listed below, as well as some personal information. Please follow the directions to create your username and password.     Your access code is: [email or web address]  Expires: 2018  4:16 PM     Your access code will  in 90 days. If you need help or a new code, please call your [facility] clinic or [phone].        Care EveryWhere ID     This is your Care EveryWhere ID. This could be used by other organizations to access your [facility] medical records  [id number]        Your Vitals Were     Pulse Temperature Respirations Height Last Period Pulse Oximetry    88 98.3  F (36.8  C) (Oral) 16 5' 3\" (1.6 m) 2004 97%    BMI (Body Mass Index)                   43.22 kg/m2            Blood Pressure from Last 3 Encounters:   18 120/70   17 118/70   16 116/70    Weight from Last 3 Encounters:   18 244 lb (110.7 kg)   17 252 lb (114.3 kg)   16 240 lb (108.9 kg)              Today, you had the following     No orders found for display         Today's Medication Changes          These changes are accurate as of 18  4:16 PM.  If you have any questions, ask your nurse or doctor.               These medicines have changed or have updated prescriptions.        Dose/Directions    * levothyroxine 125 MCG tablet   Commonly known as:  SYNTHROID/LEVOTHROID   This may have changed:  See the new instructions.   Used for:  Hypothyroidism, unspecified type   Changed by:  Maico Bell MD        1 tab  every other day (even days)   Quantity:  45 tablet   Refills:  3       * levothyroxine 112 MCG tablet   Commonly known as:  SYNTHROID/LEVOTHROID   This may have changed:  You were already taking a medication with the same name, " and this prescription was added. Make sure you understand how and when to take each.   Used for:  Hypothyroidism, unspecified type   Changed by:  Maico Bell MD        1 tab every other day (odd days)   Quantity:  45 tablet   Refills:  3       * Notice:  This list has 2 medication(s) that are the same as other medications prescribed for you. Read the directions carefully, and ask your doctor or other care provider to review them with you.         Where to get your medicines      These medications were sent to [place] [address] [place] [facility] [address]   [address] [facility] [address]     Phone:  [phone]     levothyroxine 112 MCG tablet    levothyroxine 125 MCG tablet    lisinopril-hydrochlorothiazide 20-12.5 MG per tablet         Some of these will need a paper prescription and others can be bought over the counter.  Ask your nurse if you have questions.     Bring a paper prescription for each of these medications     LORazepam 0.5 MG tablet                Primary Care Provider Office Phone # Fax #    Maico Bell [phone]       [address] [facility] [address]        Equal Access to Services     CRISTIANO ESCALANTE AH: Hadii nancy ku hadasho Soomaali, waaxda luqadaha, qaybta kaalmada adeegyada, shaniqua barajas . So [facility] [phone].    ATENCIÓN: Si habla español, tiene a dozier disposición servicios gratuitos de asistencia lingüística. [facility] [phone].    We comply with applicable federal civil rights laws and [place] laws. We do not discriminate on the basis of race, color, national origin, age, disability, sex, sexual orientation, or gender identity.            Thank you!     Thank you for choosing [facility]  for your care. Our goal is always to provide you with excellent care. Hearing back from our patients is one way we can continue to improve our services. Please take a few minutes to complete the written survey that  you may receive in the mail after your visit with us. Thank you!             Your Updated Medication List - Protect others around you: Learn how to safely use, store and throw away your medicines at www.disposemymeds.org.          This list is accurate as of 8/9/18  4:16 PM.  Always use your most recent med list.                   Brand Name Dispense Instructions for use Diagnosis    fluticasone 50 MCG/ACT spray    FLONASE    16 g    Spray 1-2 sprays into both nostrils daily    Dysfunction of Eustachian tube, left       * levothyroxine 125 MCG tablet    SYNTHROID/LEVOTHROID    45 tablet    1 tab  every other day (even days)    Hypothyroidism, unspecified type       * levothyroxine 112 MCG tablet    SYNTHROID/LEVOTHROID    45 tablet    1 tab every other day (odd days)    Hypothyroidism, unspecified type       lisinopril-hydrochlorothiazide 20-12.5 MG per tablet    PRINZIDE/ZESTORETIC    90 tablet    TAKE ONE TABLET BY MOUTH IN THE MORNING    Essential hypertension       LORazepam 0.5 MG tablet    ATIVAN    30 tablet    1 tab twice a day as needed for anxiety    Fear of flying       * Notice:  This list has 2 medication(s) that are the same as other medications prescribed for you. Read the directions carefully, and ask your doctor or other care provider to review them with you.

## 2018-08-09 NOTE — TELEPHONE ENCOUNTER
"Requested Prescriptions   Pending Prescriptions Disp Refills     levothyroxine (SYNTHROID/LEVOTHROID) 112 MCG tablet [Pharmacy Med Name: LEVOTHYROXINE 112 MCG TABLET] 30 tablet 5    Last Written Prescription Date:  5/21/18  Last Fill Quantity: 90,  # refills: 0   Last office visit: 6/16/2017 with prescribing provider:  6/16/17   Future Office Visit:   Next 5 appointments (look out 90 days)     Aug 09, 2018  3:30 PM CDT   [facility]    [address] [id number] [phone]                  Sig: TAKE 1 TABLET BY MOUTH EVERY OTHER DAY ON ODD NUMBER DAYS    Thyroid Protocol Passed    8/9/2018  1:23 AM       Passed - Patient is 12 years or older       Passed - Recent (12 mo) or future (30 days) visit within the authorizing provider's specialty    Patient had office visit in the last 12 months or has a visit in the next 30 days with authorizing provider or within the authorizing provider's specialty.  See \"Patient Info\" tab in inbasket, or \"Choose Columns\" in Meds & Orders section of the refill encounter.           Passed - Normal TSH on file in past 12 months    Recent Labs   Lab Test  08/03/18   0929   TSH  3.15             Passed - No active pregnancy on record    If patient is pregnant or has had a positive pregnancy test, please check TSH.         Passed - No positive pregnancy test in past 12 months    If patient is pregnant or has had a positive pregnancy test, please check TSH.            "

## 2018-08-09 NOTE — PATIENT INSTRUCTIONS
Continue current meds  Prescriptions refilled.    Call  [phone] or use Work[employer] to schedule a future lab appointment  fasting in 1 year.   Follow up with me a few days after these future labs are drawn to review results and other medical issues as necessary  Contact clinic if willing to have mammogram or colon cancer screening/colonoscopy  Schedule appt with me or GYN for pap/pelvic exam  Continue recently started diet for further weight loss  Increase  frequency of walking or other aerobic exercise as able (goal is daily)

## 2018-09-28 ENCOUNTER — TELEPHONE (OUTPATIENT)
Dept: INTERNAL MEDICINE | Facility: CLINIC | Age: 58
End: 2018-09-28

## 2018-09-28 NOTE — LETTER
[facility]  [address] [address]  [phone]  September 28, 2018    Padmini Alan  [facility] [facility] [address]    Dear Padmini,    We care about your health and based on a review of your medical records, recommend the the following, to better manage your health:      You are in particular need of attention regarding:  -Breast Cancer Screening  -Colon Cancer Screening  -Cervical Cancer Screening    I am recommending that you:     -schedule a MAMMOGRAM which is due.    1 in 8 women will develop invasive breast cancer during her lifetime and it is the most common non-skin cancer in American women.  EARLY detection, new treatments, and a better understanding of the disease have increased survival rates - the 5 year survival rate in the 1960s was 63% and today it is close to 90%.    If you are under/uninsured, we recommend you contact the [facility]. They offer mammograms at no charge or on a sliding fee charge. You can schedule with them at [phone]. Please have them send us the results.      Please disregard this reminder if you have had this exam elsewhere within the last year.  It would be helpful for us to have a copy of your mammogram report in your file so that we can best coordinate your care - please contact us with when your test was done so we can update your record.             -schedule a COLONOSCOPY to look for colon cancer (due every 10 years or 5 years in higher risk situations.)        Colon cancer is now the second leading cause of cancer-related deaths in the [place] for both men and women and there are over 130,000 new cases and 50,000 deaths per year from colon cancer.  Colonoscopies can prevent 90-95% of these deaths.  Problem lesions can be removed before they ever become cancer.  This test is not only looking for cancer, but also getting rid of precancerious lesions.    If you are  under/uninsured, we recommend you contact the [employer] program. [employer] is a free colorectal cancer screening program that provides colonoscopies for eligible under/uninsured [place] men and women. If you are interested in receiving a free colonoscopy, please call [employer] at [phone] (mention code [email or web address]) to see if you re eligible.      If you do not wish to do a colonoscopy or cannot afford to do one, at this time, there is another option. It is called a FIT test or Fecal Immunochemical Occult Blood Test (take home stool sample kit).  It does not replace the colonoscopy for colorectal cancer screening, but it can detect hidden bleeding in the lower colon.  It does need to be repeated every year and if a positive result is obtained, you would be referred for a colonoscopy.          If you have completed either one of these tests at another facility, please call with the details of when and where the tests were done and if they were normal or not. Or have the records sent to our clinic so that we can best coordinate your care.    -schedule a PAP SMEAR EXAM which is due.  Please disregard this reminder if you have had this exam elsewhere within the last year.  It would be helpful for us to have a copy of your recent pap smear report in our file so that we can best coordinate your care.    If you are under/uninsured, we recommend you contact the [employer] [facility]. They offer pap smears at no charge or on a sliding fee charge. You can schedule with them at [phone]. Please have them send us the results.      Here is a list of Health Maintenance topics that are due now or due soon:  Health Maintenance Due   Topic Date Due     Pap Smear - every 3 years  05/05/2007     Mammogram - every 2 years  02/23/2008     Colon Cancer Screening - every 10 years.  02/23/2008     Discuss Advance Directive Planning  04/01/2018     Flu Vaccine (1) 09/01/2018       Please call us at [phone] or [phone] (or  use [employer]) to address the above recommendations.     Thank you for trusting [facility].  We appreciate the opportunity to serve you and look forward to supporting your healthcare needs in the future.    If you have (or plan to have) any of these tests done at a facility other than a [facility] or a [facility], please have the results from these tests sent to your primary physician at [facility].    Healthy Regards,    Maico Bell MD/Concepcion Fung CMA

## 2019-01-04 DIAGNOSIS — F40.243 FEAR OF FLYING: ICD-10-CM

## 2019-01-04 RX ORDER — LORAZEPAM 0.5 MG/1
TABLET ORAL
Qty: 30 TABLET | Refills: 0 | Status: SHIPPED | OUTPATIENT
Start: 2019-01-04 | End: 2019-03-26

## 2019-01-04 NOTE — TELEPHONE ENCOUNTER
Requested Prescriptions   Pending Prescriptions Disp Refills     LORazepam (ATIVAN) 0.5 MG tablet 30 tablet 0     Si tab twice a day as needed for anxiety    There is no refill protocol information for this order        Last Written Prescription Date:  18  Last Fill Quantity: 30,  # refills: 0   Last office visit: 2018 with prescribing provider:  18   Future Office Visit:

## 2019-01-05 NOTE — TELEPHONE ENCOUNTER
Rare use of med with 30 tabs in 5 mos. RF done and rx brought to [facility] pharmacy by MD to fill

## 2019-01-16 ENCOUNTER — TELEPHONE (OUTPATIENT)
Dept: INTERNAL MEDICINE | Facility: CLINIC | Age: 59
End: 2019-01-16

## 2019-01-16 NOTE — TELEPHONE ENCOUNTER
Reason for Call:  Other immunization update - whooping cough    Detailed comments: the patient has become a grandmother, and her daughter wants to make sure she is current on her immunizations - whooping cough    Phone Number Patient can be reached at: Home number on file [phone] (home)    Best Time: anytime    Can we leave a detailed message on this number? YES    Call taken on 1/16/2019 at 10:42 AM by Joanne Parker

## 2019-03-26 DIAGNOSIS — F40.243 FEAR OF FLYING: ICD-10-CM

## 2019-03-26 RX ORDER — LORAZEPAM 0.5 MG/1
TABLET ORAL
Qty: 30 TABLET | Refills: 0 | Status: SHIPPED | OUTPATIENT
Start: 2019-03-26 | End: 2019-06-14

## 2019-03-26 NOTE — TELEPHONE ENCOUNTER
LORazepam (ATIVAN) 0.5 MG tablet      Last Written Prescription Date:  1/04/2019  Last Fill Quantity: 30,   # refills: 0  Last Office Visit: 8/09/2018  Future Office visit:       Routing refill request to provider for review/approval because:  Drug not on the [facility], [facility] or [facility] refill protocol or controlled substance

## 2019-04-04 ENCOUNTER — TELEPHONE (OUTPATIENT)
Dept: INTERNAL MEDICINE | Facility: CLINIC | Age: 59
End: 2019-04-04

## 2019-04-04 DIAGNOSIS — Z12.31 ENCOUNTER FOR SCREENING MAMMOGRAM FOR BREAST CANCER: Primary | ICD-10-CM

## 2019-04-04 NOTE — TELEPHONE ENCOUNTER
Panel Management Review    Patient Active Problem List   Diagnosis     Anxiety state     Other diseases of nasal cavity and sinuses     Hyperlipidemia LDL goal <130     ACP (advance care planning)     Hypothyroidism, unspecified type     Essential hypertension     Morbid obesity, unspecified obesity type (H)     Patient has the following on her problem list:   Hypertension   Last three blood pressure readings:  BP Readings from Last 3 Encounters:   08/09/18 120/70   06/16/17 118/70   08/25/16 116/70     Blood pressure: Passed    HTN Guidelines:  Age 18-59 BP range:  Less than 140/90  Age 60-85 with Diabetes:  Less than 140/90  Age 60-85 without Diabetes:  less than 150/90      Composite cancer screening  Chart review shows that this patient is due/due soon for the following Mammogram and FIT  Summary:    Patient is due/failing the following:   FIT and MAMMOGRAM    Action needed:   Patient needs referral/order: FOR FIT AND MAMMOGRAM    Type of outreach:    None, routed to provider for review.    Questions for provider review:    None                                                                                                                                    Concepcion Fung, [facility]       Chart routed to Provider .

## 2019-06-10 DIAGNOSIS — F40.243 FEAR OF FLYING: ICD-10-CM

## 2019-06-10 NOTE — TELEPHONE ENCOUNTER
Requested Prescriptions   Pending Prescriptions Disp Refills     LORazepam (ATIVAN) 0.5 MG tablet 30 tablet 0     Si tab twice a day as needed for anxiety       There is no refill protocol information for this order      Controlled Substance Refill Request for lorazepam ativan  Problem List Complete:  Yes    Last Written Prescription Date:  19  Last Fill Quantity: 30,   # refills: 0    THE MOST RECENT OFFICE VISIT MUST BE WITHIN THE PAST 3 MONTHS. AT LEAST ONE FACE TO FACE VISIT MUST OCCUR EVERY 6 MONTHS. ADDITIONAL VISITS CAN BE VIRTUAL.  (THIS STATEMENT SHOULD BE DELETED.)    Last Office Visit with [facility] care provider: 18    Future Office visit: 0    Controlled substance agreement:   Encounter-Level CSA:    There are no encounter-level csa.     Patient-Level CSA:    There are no patient-level csa.         Last Urine Drug Screen: No results found for: CDAUT, No results found for: COMDAT, No results found for: THC13, PCP13, COC13, MAMP13, OPI13, AMP13, BZO13, TCA13, MTD13, BAR13, OXY13, PPX13, BUP13     Processing:  Staff will hand deliver Rx to on-site pharmacy    [email or web address]   checked in past 3 months?  No, route to RN

## 2019-06-14 RX ORDER — LORAZEPAM 0.5 MG/1
TABLET ORAL
Qty: 30 TABLET | Refills: 0 | Status: SHIPPED | OUTPATIENT
Start: 2019-06-14 | End: 2019-11-18

## 2019-06-14 NOTE — TELEPHONE ENCOUNTER
Has used 30 tabs in the past 3 mos. OK for refill. Printed Rx in [place] basket. Please bring to [facility] pharmacy and inform pt will need to be seen in clinic before further refills of this med will be considered. Last seen Aug 2018

## 2019-07-20 DIAGNOSIS — E03.9 HYPOTHYROIDISM, UNSPECIFIED TYPE: ICD-10-CM

## 2019-07-22 RX ORDER — LEVOTHYROXINE SODIUM 125 UG/1
TABLET ORAL
Qty: 45 TABLET | Refills: 0 | Status: SHIPPED | OUTPATIENT
Start: 2019-07-22 | End: 2019-10-19

## 2019-08-05 DIAGNOSIS — I10 ESSENTIAL HYPERTENSION: ICD-10-CM

## 2019-08-05 RX ORDER — LISINOPRIL AND HYDROCHLOROTHIAZIDE 12.5; 2 MG/1; MG/1
TABLET ORAL
Qty: 90 TABLET | Refills: 0 | Status: SHIPPED | OUTPATIENT
Start: 2019-08-05 | End: 2019-10-30

## 2019-08-05 NOTE — LETTER
[facility]  [address] [id number] [phone]            Padmini Alan  [facility] [facility] [address] [facility] [address]        August 5, 2019    Dear Padmini,    While refilling your prescription today, we noticed that you are due for an appointment with your provider.  We will refill your prescription for 30 days, but a follow-up appointment must be made before any additional refills can be approved.     Taking care of your health is important to us and we look forward to seeing you in the near future.  Please call us at [phone] or [phone] (or use [employer]) to schedule an appointment.     Please disregard this notice if you have already made an appointment.    Sincerely,        [facility]

## 2019-08-05 NOTE — TELEPHONE ENCOUNTER
"Requested Prescriptions   Pending Prescriptions Disp Refills     lisinopril-hydrochlorothiazide (PRINZIDE/ZESTORETIC) 20-12.5 MG tablet [Pharmacy Med Name: LISINOPRIL-HCTZ 20-12.5 MG TAB] 90 tablet 3     Sig: TAKE 1 TABLET BY MOUTH EVERY DAY IN THE MORNING       Diuretics (Including Combos) Protocol Failed - 8/5/2019  1:15 AM        Failed - Normal serum creatinine on file in past 12 months     Recent Labs   Lab Test 08/03/18  0929   CR 1.02              Failed - Normal serum potassium on file in past 12 months     Recent Labs   Lab Test 08/03/18  0929   POTASSIUM 3.9                    Failed - Normal serum sodium on file in past 12 months     Recent Labs   Lab Test 08/03/18  0929                 Passed - Blood pressure under 140/90 in past 12 months     BP Readings from Last 3 Encounters:   08/09/18 120/70   06/16/17 118/70   08/25/16 116/70                 Passed - Recent (12 mo) or future (30 days) visit within the authorizing provider's specialty     Patient had office visit in the last 12 months or has a visit in the next 30 days with authorizing provider or within the authorizing provider's specialty.  See \"Patient Info\" tab in inbasket, or \"Choose Columns\" in Meds & Orders section of the refill encounter.              Passed - Medication is active on med list        Passed - Patient is age 18 or older        Passed - No active pregancy on record        Passed - No positive pregnancy test in past 12 months        Last Written Prescription Date:  8/9/2019  Last Fill Quantity: 90,  # refills: 3   Last office visit: 8/9/2018 with prescribing provider:  Maico Bell     Future Office Visit:      Medication is being filled for 1 time refill only due to:  Patient needs to be seen because it has been more than one year since last visit.     Letter sent.     Prescription approved per [facility] Refill Protocol.    Jennifer ORDOÑEZ RN, BSN, PHN      "

## 2019-10-19 DIAGNOSIS — E03.9 HYPOTHYROIDISM, UNSPECIFIED TYPE: ICD-10-CM

## 2019-10-19 NOTE — TELEPHONE ENCOUNTER
"Requested Prescriptions   Pending Prescriptions Disp Refills     levothyroxine (SYNTHROID/LEVOTHROID) 125 MCG tablet [Pharmacy Med Name: LEVOTHYROXINE 125 MCG TABLET] 45 tablet 0     Sig: TAKE 1 TABLET BY MOUTH EVERY OTHER DAY (EVEN DAYS)   Last Written Prescription Date:  7/22/2019  Last Fill Quantity: 45,  # refills: 0   Last Office Visit: 8/9/2018   Future Office Visit:         Thyroid Protocol Failed - 10/19/2019  8:47 AM        Failed - Recent (12 mo) or future (30 days) visit within the authorizing provider's specialty     Patient has had an office visit with the authorizing provider or a provider within the authorizing providers department within the previous 12 mos or has a future within next 30 days. See \"Patient Info\" tab in inbasket, or \"Choose Columns\" in Meds & Orders section of the refill encounter.              Failed - Normal TSH on file in past 12 months     Recent Labs   Lab Test 08/03/18  0929   TSH 3.15              Passed - Patient is 12 years or older        Passed - Medication is active on med list        Passed - No active pregnancy on record     If patient is pregnant or has had a positive pregnancy test, please check TSH.          Passed - No positive pregnancy test in past 12 months     If patient is pregnant or has had a positive pregnancy test, please check TSH.          levothyroxine (SYNTHROID/LEVOTHROID) 112 MCG tablet [Pharmacy Med Name: LEVOTHYROXINE 112 MCG TABLET] 45 tablet 7     Sig: TAKE 1 TABLET BY MOUTH EVERY OTHER DAY ON ODD NUMBER DAYS   Last Written Prescription Date:  8/9/2018  Last Fill Quantity: 45,  # refills: 3   Last Office Visit: 8/9/2018   Future Office Visit:         Thyroid Protocol Failed - 10/19/2019  8:47 AM        Failed - Recent (12 mo) or future (30 days) visit within the authorizing provider's specialty     Patient has had an office visit with the authorizing provider or a provider within the authorizing providers department within the previous 12 mos or " "has a future within next 30 days. See \"Patient Info\" tab in inbasket, or \"Choose Columns\" in Meds & Orders section of the refill encounter.              Failed - Normal TSH on file in past 12 months     Recent Labs   Lab Test 08/03/18  0929   TSH 3.15              Passed - Patient is 12 years or older        Passed - Medication is active on med list        Passed - No active pregnancy on record     If patient is pregnant or has had a positive pregnancy test, please check TSH.          Passed - No positive pregnancy test in past 12 months     If patient is pregnant or has had a positive pregnancy test, please check TSH.            "

## 2019-10-22 NOTE — TELEPHONE ENCOUNTER
Was sent letter 2 mos ago telling her to make appt. None made. Overdue for f/u thyroid and other  labs also. Call pt and get a fasting lab appt scheduled followed by an appointment with me a few days later to review lab reuslts, etc and then route RF request back to me to address until pt seen back in clinic

## 2019-10-22 NOTE — TELEPHONE ENCOUNTER
Routing refill request to provider for review/approval because:  Breonna given x1 and patient did not follow up, please advise  Patient needs to be seen because it has been more than 1 year since last office visit.

## 2019-10-30 DIAGNOSIS — E03.9 HYPOTHYROIDISM, UNSPECIFIED TYPE: Primary | ICD-10-CM

## 2019-10-30 DIAGNOSIS — E66.01 MORBID OBESITY, UNSPECIFIED OBESITY TYPE (H): ICD-10-CM

## 2019-10-30 DIAGNOSIS — I10 ESSENTIAL HYPERTENSION: ICD-10-CM

## 2019-10-30 NOTE — TELEPHONE ENCOUNTER
"Requested Prescriptions   Pending Prescriptions Disp Refills     lisinopril-hydrochlorothiazide (PRINZIDE/ZESTORETIC) 20-12.5 MG tablet [Pharmacy Med Name: LISINOPRIL-HCTZ 20-12.5 MG TAB] 90 tablet 0     Sig: TAKE 1 TABLET BY MOUTH EVERY DAY IN THE MORNING       Diuretics (Including Combos) Protocol Failed - 10/30/2019  1:50 AM        Failed - Blood pressure under 140/90 in past 12 months     BP Readings from Last 3 Encounters:   08/09/18 120/70   06/16/17 118/70   08/25/16 116/70                 Failed - Recent (12 mo) or future (30 days) visit within the authorizing provider's specialty     Patient has had an office visit with the authorizing provider or a provider within the authorizing providers department within the previous 12 mos or has a future within next 30 days. See \"Patient Info\" tab in inbasket, or \"Choose Columns\" in Meds & Orders section of the refill encounter.              Failed - Normal serum creatinine on file in past 12 months     Recent Labs   Lab Test 08/03/18  0929   CR 1.02              Failed - Normal serum potassium on file in past 12 months     Recent Labs   Lab Test 08/03/18  0929   POTASSIUM 3.9                    Failed - Normal serum sodium on file in past 12 months     Recent Labs   Lab Test 08/03/18  0929                 Passed - Medication is active on med list        Passed - Patient is age 18 or older        Passed - No active pregancy on record        Passed - No positive pregnancy test in past 12 months        Routing refill request to provider for review/approval because:  Breonna given x1 and patient did not follow up, please advise  Patient needs to be seen because it has been more than 1 year since last office visit.        "

## 2019-10-31 NOTE — TELEPHONE ENCOUNTER
Was sent letter 2 mos ago telling her to make appt. None made. Overdue for f/u thyroid and other  labs also. Call pt and get a fasting lab appt scheduled followed by an appointment with me a few days later to review lab reuslts,  BP, etc and then route RF request back to me to address until pt seen back in clinic

## 2019-11-04 NOTE — TELEPHONE ENCOUNTER
Routing to Dr. Bell--    Pt has future appt's scheduled:  LAB: 11/5/2019 10:30 AM  PCP: 11/18/2019 10:00 AM

## 2019-11-05 DIAGNOSIS — E03.9 HYPOTHYROIDISM, UNSPECIFIED TYPE: ICD-10-CM

## 2019-11-05 DIAGNOSIS — E66.01 MORBID OBESITY, UNSPECIFIED OBESITY TYPE (H): ICD-10-CM

## 2019-11-05 DIAGNOSIS — I10 ESSENTIAL HYPERTENSION: ICD-10-CM

## 2019-11-05 LAB
ALBUMIN SERPL-MCNC: 3.7 G/DL (ref 3.4–5)
ALP SERPL-CCNC: 58 U/L (ref 40–150)
ALT SERPL W P-5'-P-CCNC: 29 U/L (ref 0–50)
ANION GAP SERPL CALCULATED.3IONS-SCNC: 5 MMOL/L (ref 3–14)
AST SERPL W P-5'-P-CCNC: 16 U/L (ref 0–45)
BILIRUB SERPL-MCNC: 0.4 MG/DL (ref 0.2–1.3)
BUN SERPL-MCNC: 17 MG/DL (ref 7–30)
CALCIUM SERPL-MCNC: 9.9 MG/DL (ref 8.5–10.1)
CHLORIDE SERPL-SCNC: 105 MMOL/L (ref 94–109)
CHOLEST SERPL-MCNC: 233 MG/DL
CO2 SERPL-SCNC: 27 MMOL/L (ref 20–32)
CREAT SERPL-MCNC: 0.97 MG/DL (ref 0.52–1.04)
GFR SERPL CREATININE-BSD FRML MDRD: 63 ML/MIN/{1.73_M2}
GLUCOSE SERPL-MCNC: 88 MG/DL (ref 70–99)
HDLC SERPL-MCNC: 54 MG/DL
LDLC SERPL CALC-MCNC: 140 MG/DL
NONHDLC SERPL-MCNC: 179 MG/DL
POTASSIUM SERPL-SCNC: 3.8 MMOL/L (ref 3.4–5.3)
PROT SERPL-MCNC: 7.1 G/DL (ref 6.8–8.8)
SODIUM SERPL-SCNC: 137 MMOL/L (ref 133–144)
T4 FREE SERPL-MCNC: 1.17 NG/DL (ref 0.76–1.46)
TRIGL SERPL-MCNC: 193 MG/DL
TSH SERPL DL<=0.005 MIU/L-ACNC: 4.55 MU/L (ref 0.4–4)

## 2019-11-05 PROCEDURE — 80053 COMPREHEN METABOLIC PANEL: CPT | Performed by: INTERNAL MEDICINE

## 2019-11-05 PROCEDURE — 80061 LIPID PANEL: CPT | Performed by: INTERNAL MEDICINE

## 2019-11-05 PROCEDURE — 36415 COLL VENOUS BLD VENIPUNCTURE: CPT | Performed by: INTERNAL MEDICINE

## 2019-11-05 PROCEDURE — 84439 ASSAY OF FREE THYROXINE: CPT | Performed by: INTERNAL MEDICINE

## 2019-11-05 PROCEDURE — 84443 ASSAY THYROID STIM HORMONE: CPT | Performed by: INTERNAL MEDICINE

## 2019-11-05 RX ORDER — LISINOPRIL AND HYDROCHLOROTHIAZIDE 12.5; 2 MG/1; MG/1
TABLET ORAL
Qty: 30 TABLET | Refills: 0 | Status: SHIPPED | OUTPATIENT
Start: 2019-11-05 | End: 2019-11-18

## 2019-11-05 RX ORDER — LEVOTHYROXINE SODIUM 125 UG/1
TABLET ORAL
Qty: 30 TABLET | Refills: 0 | Status: SHIPPED | OUTPATIENT
Start: 2019-11-05 | End: 2019-11-18 | Stop reason: DRUGHIGH

## 2019-11-05 RX ORDER — LEVOTHYROXINE SODIUM 112 UG/1
TABLET ORAL
Qty: 30 TABLET | Refills: 0 | Status: SHIPPED | OUTPATIENT
Start: 2019-11-05 | End: 2019-11-18 | Stop reason: DRUGHIGH

## 2019-11-18 ENCOUNTER — OFFICE VISIT (OUTPATIENT)
Dept: INTERNAL MEDICINE | Facility: CLINIC | Age: 59
End: 2019-11-18
Payer: COMMERCIAL

## 2019-11-18 VITALS
DIASTOLIC BLOOD PRESSURE: 72 MMHG | SYSTOLIC BLOOD PRESSURE: 114 MMHG | WEIGHT: 251 LBS | RESPIRATION RATE: 16 BRPM | TEMPERATURE: 98.3 F | BODY MASS INDEX: 44.46 KG/M2 | HEART RATE: 88 BPM | OXYGEN SATURATION: 95 %

## 2019-11-18 DIAGNOSIS — Z12.11 SPECIAL SCREENING FOR MALIGNANT NEOPLASMS, COLON: ICD-10-CM

## 2019-11-18 DIAGNOSIS — F41.1 ANXIETY STATE: ICD-10-CM

## 2019-11-18 DIAGNOSIS — I10 ESSENTIAL HYPERTENSION: ICD-10-CM

## 2019-11-18 DIAGNOSIS — E66.01 MORBID OBESITY, UNSPECIFIED OBESITY TYPE (H): ICD-10-CM

## 2019-11-18 DIAGNOSIS — E78.5 HYPERLIPIDEMIA LDL GOAL <130: ICD-10-CM

## 2019-11-18 DIAGNOSIS — E03.9 HYPOTHYROIDISM, UNSPECIFIED TYPE: Primary | ICD-10-CM

## 2019-11-18 DIAGNOSIS — Z23 NEED FOR PROPHYLACTIC VACCINATION AND INOCULATION AGAINST INFLUENZA: ICD-10-CM

## 2019-11-18 PROCEDURE — 99214 OFFICE O/P EST MOD 30 MIN: CPT | Mod: 25 | Performed by: INTERNAL MEDICINE

## 2019-11-18 PROCEDURE — 90682 RIV4 VACC RECOMBINANT DNA IM: CPT | Performed by: INTERNAL MEDICINE

## 2019-11-18 PROCEDURE — 90471 IMMUNIZATION ADMIN: CPT | Performed by: INTERNAL MEDICINE

## 2019-11-18 RX ORDER — LISINOPRIL AND HYDROCHLOROTHIAZIDE 12.5; 2 MG/1; MG/1
TABLET ORAL
Qty: 90 TABLET | Refills: 3 | Status: SHIPPED | OUTPATIENT
Start: 2019-11-18 | End: 2020-10-31

## 2019-11-18 RX ORDER — LEVOTHYROXINE SODIUM 125 UG/1
TABLET ORAL
Qty: 90 TABLET | Refills: 3 | Status: SHIPPED | OUTPATIENT
Start: 2019-11-18 | End: 2019-12-20

## 2019-11-18 RX ORDER — LORAZEPAM 0.5 MG/1
TABLET ORAL
Qty: 30 TABLET | Refills: 0 | Status: SHIPPED | OUTPATIENT
Start: 2019-11-18 | End: 2019-12-13

## 2019-11-18 ASSESSMENT — ANXIETY QUESTIONNAIRES
6. BECOMING EASILY ANNOYED OR IRRITABLE: NOT AT ALL
GAD7 TOTAL SCORE: 4
IF YOU CHECKED OFF ANY PROBLEMS ON THIS QUESTIONNAIRE, HOW DIFFICULT HAVE THESE PROBLEMS MADE IT FOR YOU TO DO YOUR WORK, TAKE CARE OF THINGS AT HOME, OR GET ALONG WITH OTHER PEOPLE: NOT DIFFICULT AT ALL
2. NOT BEING ABLE TO STOP OR CONTROL WORRYING: SEVERAL DAYS
1. FEELING NERVOUS, ANXIOUS, OR ON EDGE: SEVERAL DAYS
5. BEING SO RESTLESS THAT IT IS HARD TO SIT STILL: NOT AT ALL
7. FEELING AFRAID AS IF SOMETHING AWFUL MIGHT HAPPEN: NOT AT ALL
3. WORRYING TOO MUCH ABOUT DIFFERENT THINGS: SEVERAL DAYS

## 2019-11-18 ASSESSMENT — PATIENT HEALTH QUESTIONNAIRE - PHQ9: 5. POOR APPETITE OR OVEREATING: SEVERAL DAYS

## 2019-11-18 NOTE — PATIENT INSTRUCTIONS
Stop Levothyroxine 112mcg tab   Increase Levothyroxine 125mg tab to 1 tab daily for thyroid   Nonfasting thyroid lab in 6 week  Encourage mammogram and pap/pelvic exam appts at [facility]. GYN appt with myself or GYN dept with female provider   Flu vaccine  FIT stool test. Colonoscopy in future when willing  Reduce calorie/carbohydrate (sugar, bread, potato, pasta, rice, alcohol etc)  intake in diet.  Increase color on your plate with fruits and vegetables. Increase  frequency of walking or other aerobic exercise as able (goal is daily)  Refill Lorazepam. No driving for 2 hours after use. If anxiety worsening or increasing frequency of need for med, then see me to discuss daily preventative option instead for anxiety  Continue other meds, RFs done

## 2019-11-18 NOTE — PROGRESS NOTES
Subjective     Padmini Alan is a 59 year old female who presents to clinic today for the following health issues:    HPI   Hypertension Follow-up      Do you check your blood pressure regularly outside of the clinic? No     Are you following a low salt diet? Yes    Are your blood pressures ever more than 140 on the top number (systolic) OR more   than 90 on the bottom number (diastolic), for example 140/90? Unknown    Hypothyroidism Follow-up      Since last visit, patient describes the following symptoms: Weight stable, no hair loss, no skin changes, no constipation, no loose stools      How many servings of fruits and vegetables do you eat daily? 2-3    On average, how many sweetened beverages do you drink each day (soda, juice, sweet tea, etc)?   1    How many days per week do you miss taking your medication? 0      Pt's past medical history, family history, habits, medications and allergies were reviewed with the patient today.  See snap shot for  HCM status. Most recent lab results reviewed with pt. Problem list and histories reviewed & adjusted, as indicated.  Additional history as below:    Component      Latest Ref Rng & Units 6/6/2017 8/9/2017 8/3/2018 11/5/2019   Sodium      133 - 144 mmol/L 141  141 137   Potassium      3.4 - 5.3 mmol/L 3.9  3.9 3.8   Chloride      94 - 109 mmol/L 107  107 105   Carbon Dioxide      20 - 32 mmol/L 25  26 27   Anion Gap      3 - 14 mmol/L 9  8 5   Glucose      70 - 99 mg/dL 99  82 88   Urea Nitrogen      7 - 30 mg/dL 16  13 17   Creatinine      0.52 - 1.04 mg/dL 0.91  1.02 0.97   GFR Estimate      >60 mL/min/1.73:m2 63  55 (L) 63   GFR Estimate If Black      >60 mL/min/1.73:m2 76  67 73   Calcium      8.5 - 10.1 mg/dL 10.0  9.5 9.9   Bilirubin Total      0.2 - 1.3 mg/dL    0.4   Albumin      3.4 - 5.0 g/dL    3.7   Protein Total      6.8 - 8.8 g/dL    7.1   Alkaline Phosphatase      40 - 150 U/L    58   ALT      0 - 50 U/L    29   AST      0 - 45 U/L    16  "  Cholesterol      <200 mg/dL    233 (H)   Triglycerides      <150 mg/dL    193 (H)   HDL Cholesterol      >49 mg/dL    54   LDL Cholesterol Calculated      <100 mg/dL    140 (H)   Non HDL Cholesterol      <130 mg/dL    179 (H)   TSH      0.40 - 4.00 mU/L 2.05 0.90 3.15 4.55 (H)   T4 Free      0.76 - 1.46 ng/dL    1.17     Weight up 7 pounds in 1 year.  Denies chest pain, shortness of breath, abdominal pain, headache, vision changes or side effects with medications.   Has intermittent anxiety with babysitting grandkids. Has used 30 tabs of Lorazepam in 5+ months . Denies depression  Pt was not fasting for lipids lab above  Declines colonoscopy. Due for mammogram and pap/pelvic exam. Decline both being done today     No identified additional risks  The 10-year ASCVD risk score (Karla DILLON Jr., et al., 2013) is: 5.4%    Values used to calculate the score:      Age: 59 years      Sex: Female      Is Non- : No      Diabetic: No      Tobacco smoker: No      Systolic Blood Pressure: 126 mmHg      Is BP treated: Yes      HDL Cholesterol: 54 mg/dL      Total Cholesterol: 233 mg/dL    Additional ROS:   Constitutional, HEENT, Cardiovascular, Pulmonary, GI and , Neuro, MSK and Psych review of systems/symptoms are otherwise negative or unchanged from previous, except as noted above.      OBJECTIVE:  /84   Pulse 88   Temp 98.3  F (36.8  C) (Temporal)   Resp 16   Wt 113.9 kg (251 lb)   LMP 12/23/2004   SpO2 95%   BMI 44.46 kg/m     Estimated body mass index is 44.46 kg/m  as calculated from the following:    Height as of 8/9/18: 1.6 m (5' 3\").    Weight as of this encounter: 113.9 kg (251 lb).     Neck: no adenopathy. Thyroid normal to palpation. No bruits  Pulm: Lungs clear to auscultation   CV: Regular rates and rhythm  GI: Soft, obese, nontender, Normal active bowel sounds, No hepatosplenomegaly or masses palpable  Ext: Peripheral pulses intact. No edema.  Neuro: Normal strength and tone, " sensory exam grossly normal    Assessment/Plan: (See plan discussion below for further details)  1. Hypothyroidism, unspecified type   Uncontrolled. Will increase Levothroxine  - levothyroxine (SYNTHROID/LEVOTHROID) 125 MCG tablet; 1 tab daily in AM  Dispense: 90 tablet; Refill: 3  - TSH with free T4 reflex; Future    2. Hyperlipidemia LDL goal <130   Pt states recent lipids not done fasting and thyroid function also too low now. Will recheck 6 mos  - Lipid panel reflex to direct LDL Fasting; Future    3. Essential hypertension  Controlled. Continue med  - lisinopril-hydrochlorothiazide (PRINZIDE/ZESTORETIC) 20-12.5 MG tablet; TAKE 1 TABLET BY MOUTH EVERY MORNING.  Dispense: 90 tablet; Refill: 3    4. Morbid obesity, unspecified obesity type (H)   Weight worsened. Thyroid function low. Med change as above.  See counseling also    5. Anxiety state   CLEMENTE = 4. Continue med prn for now. Possible future serotonin specific reuptake inhibitor daily. Declines counseling  - LORazepam (ATIVAN) 0.5 MG tablet; 1 tab twice a day as needed for anxiety  Dispense: 30 tablet; Refill: 0    6. Special screening for malignant neoplasms, colon  Declines  colonoscopy despite MD recommendation for the procedure. FIT test ordered. Reviewed the reduced sensitivity of the FIT test for colon cancer screening compared to colonoscopy and pt states understanding of this. Patient to inform physician in the future if willing to undergo future colonoscopy.   - Fecal colorectal cancer screen (FIT); Future    7. Need for prophylactic vaccination and inoculation against influenza  - INFLUENZA QUAD, RECOMBINANT, P-FREE (RIV4) (FLUBLOCK) [06136]  - Vaccine Administration, Initial [74656]      Plan discussion:   Stop Levothyroxine 112mcg tab   Increase Levothyroxine 125mg tab to 1 tab daily for thyroid   Nonfasting thyroid lab in 6 week  Encourage mammogram and pap/pelvic exam appts at [facility]. GYN appt with myself or GYN dept with female provider   Flu  vaccine  FIT stool test. Colonoscopy in future when willing  Reduce calorie/carbohydrate (sugar, bread, potato, pasta, rice, alcohol etc)  intake in diet.  Increase color on your plate with fruits and vegetables. Increase  frequency of walking or other aerobic exercise as able (goal is daily)  Refill Lorazepam. No driving for 2 hours after use. If anxiety worsening or increasing frequency of need for med, then see me to discuss daily preventative option instead for anxiety  Continue other meds, RFs done          Maico Bell MD  Internal Medicine Department  [facility]    (Chart documentation was completed, in part, with [employer] voice-recognition software. Even though reviewed, some grammatical, spelling, and word errors may remain.)

## 2019-11-19 ASSESSMENT — ANXIETY QUESTIONNAIRES: GAD7 TOTAL SCORE: 4

## 2019-12-10 DIAGNOSIS — F41.1 ANXIETY STATE: ICD-10-CM

## 2019-12-10 NOTE — TELEPHONE ENCOUNTER
LORazepam (ATIVAN) 0.5 MG tablet      Last Written Prescription Date:  11/18/2019  Last Fill Quantity: 30,   # refills: 0  Last Office Visit: 11/18/2019  Future Office visit:       Routing refill request to provider for review/approval because:  Drug not on the [facility], [facility] or [facility] refill protocol or controlled substance

## 2019-12-11 NOTE — TELEPHONE ENCOUNTER
Call pt. Was seen 3 weeks ago. Had used #30 tabs previous 5 mos and just refilled the Rx I gave her another  #30 tabs on 11/18/19 and now requesting a refill again. Why is RF being requested now? If pt having acute severe anxiety sx requiring frequent use of med, then needs to be started on daily nonaddictive prevention medication for anxiety rather than just having the prn Lorazepam to use. Send update info back to me along with new CLEMENTE  results

## 2019-12-13 RX ORDER — LORAZEPAM 0.5 MG/1
TABLET ORAL
Qty: 30 TABLET | Refills: 0 | Status: SHIPPED | OUTPATIENT
Start: 2019-12-13 | End: 2021-04-07

## 2019-12-13 ASSESSMENT — ANXIETY QUESTIONNAIRES
5. BEING SO RESTLESS THAT IT IS HARD TO SIT STILL: NOT AT ALL
2. NOT BEING ABLE TO STOP OR CONTROL WORRYING: NOT AT ALL
3. WORRYING TOO MUCH ABOUT DIFFERENT THINGS: NOT AT ALL
6. BECOMING EASILY ANNOYED OR IRRITABLE: NOT AT ALL
7. FEELING AFRAID AS IF SOMETHING AWFUL MIGHT HAPPEN: NOT AT ALL
1. FEELING NERVOUS, ANXIOUS, OR ON EDGE: SEVERAL DAYS
GAD7 TOTAL SCORE: 2
4. TROUBLE RELAXING: SEVERAL DAYS
IF YOU CHECKED OFF ANY PROBLEMS ON THIS QUESTIONNAIRE, HOW DIFFICULT HAVE THESE PROBLEMS MADE IT FOR YOU TO DO YOUR WORK, TAKE CARE OF THINGS AT HOME, OR GET ALONG WITH OTHER PEOPLE: NOT DIFFICULT AT ALL

## 2019-12-13 NOTE — TELEPHONE ENCOUNTER
Called patient and left message to call back. Please give message from provider to patient when she calls back.

## 2019-12-13 NOTE — TELEPHONE ENCOUNTER
States she was told by provider to take twice daily and this has worked well . Forgets to take at times.CLEMENTE=2  With BID use.Christi Candelaria RN

## 2019-12-14 ASSESSMENT — ANXIETY QUESTIONNAIRES: GAD7 TOTAL SCORE: 2

## 2019-12-14 NOTE — TELEPHONE ENCOUNTER
I did NOT tell her to start taking the medication twice a day. Pt has used #30 tabs in previous 5 mos. My specific instructions from her recent appt was :    Refill Lorazepam. No driving for 2 hours after use. If anxiety worsening or increasing frequency of need for med, then see me to discuss daily preventative option instead for anxiety    Pt to use med only if having significant anxiety. I have refilled for #30 tabs. Pt to be seen back in clinic in the next 30 days to start  serotonin specific reuptake inhibitor daily preventative anxiety medication. Will NOT consider additional refill of Lorazepam after this without appt. Lorazepam has addiction potential and NOT a first line daily use anxiety prevention treatment  Call pt and assist with scheduling appt with me

## 2019-12-20 DIAGNOSIS — E03.9 HYPOTHYROIDISM, UNSPECIFIED TYPE: ICD-10-CM

## 2019-12-20 RX ORDER — LEVOTHYROXINE SODIUM 125 UG/1
125 TABLET ORAL DAILY
Qty: 30 TABLET | Refills: 10 | Status: SHIPPED | OUTPATIENT
Start: 2019-12-20 | End: 2019-12-20

## 2019-12-20 RX ORDER — LEVOTHYROXINE SODIUM 125 UG/1
TABLET ORAL
Qty: 90 TABLET | Refills: 3 | Status: SHIPPED | OUTPATIENT
Start: 2019-12-20 | End: 2021-03-03

## 2019-12-20 NOTE — TELEPHONE ENCOUNTER
Prescription resent for #90 with 3 refills.     Prescription approved per [facility] Refill Protocol.  Jennifer STAFFORDN, RN, PHN

## 2019-12-20 NOTE — TELEPHONE ENCOUNTER
Request for #30 supply instead of #90    Prescription approved per [facility] Refill Protocol.    Jennifer STAFFORDN, RN, PHN

## 2019-12-20 NOTE — TELEPHONE ENCOUNTER
"Requested Prescriptions   Pending Prescriptions Disp Refills     levothyroxine (SYNTHROID/LEVOTHROID) 125 MCG tablet [Pharmacy Med Name: LEVOTHYROXINE 125 MCG TABLET] 30 tablet 0     Sig: TAKE 1 TABLET BY MOUTH EVERY OTHER DAY (EVEN DAYS)       Thyroid Protocol Failed - 12/20/2019  9:26 AM        Failed - Normal TSH on file in past 12 months     Recent Labs   Lab Test 11/05/19  1028   TSH 4.55*              Passed - Patient is 12 years or older        Passed - Recent (12 mo) or future (30 days) visit within the authorizing provider's specialty     Patient has had an office visit with the authorizing provider or a provider within the authorizing providers department within the previous 12 mos or has a future within next 30 days. See \"Patient Info\" tab in inbasket, or \"Choose Columns\" in Meds & Orders section of the refill encounter.              Passed - Medication is active on med list        Passed - No active pregnancy on record     If patient is pregnant or has had a positive pregnancy test, please check TSH.          Passed - No positive pregnancy test in past 12 months     If patient is pregnant or has had a positive pregnancy test, please check TSH.          "

## 2019-12-26 NOTE — TELEPHONE ENCOUNTER
Patient informed.     She is no longer using BID. She will call back to schedule as she she can because she has some upcoming vacations.    Jennifer STAFFORDN, RN, PHN

## 2019-12-30 DIAGNOSIS — E03.9 HYPOTHYROIDISM, UNSPECIFIED TYPE: ICD-10-CM

## 2019-12-30 DIAGNOSIS — I10 ESSENTIAL HYPERTENSION: Primary | ICD-10-CM

## 2019-12-30 LAB — TSH SERPL DL<=0.005 MIU/L-ACNC: 1.15 MU/L (ref 0.4–4)

## 2019-12-30 PROCEDURE — 84443 ASSAY THYROID STIM HORMONE: CPT | Performed by: INTERNAL MEDICINE

## 2019-12-30 PROCEDURE — 36415 COLL VENOUS BLD VENIPUNCTURE: CPT | Performed by: INTERNAL MEDICINE

## 2019-12-30 RX ORDER — LEVOTHYROXINE SODIUM 112 UG/1
TABLET ORAL
Qty: 30 TABLET | Refills: 0 | OUTPATIENT
Start: 2019-12-30

## 2019-12-30 NOTE — LETTER
"[facility]  [address] [address]  [phone]      1/3/2020       Padmini Alan  [address] [facility] [address]        Dear Padmini,  Here are your most recent lab results. Unless commented on below, mild variation of results  outside the normal range are not clinically signicant.    Resulted Orders   TSH with free T4 reflex   Result Value Ref Range    TSH 1.15 0.40 - 4.00 mU/L       Thyroid lab results were normal.  Continue current medication.  Please contact your pharmacy if you need further refills of your medication.  Call our appointment desk at [phone] or use [employer] to schedule a \"lab only\" to have your Basic Metabolic Panel, Lipid profile (Cholesterol Panel) and TSH checked fasting in 5 months.  For fasting labs, please refrain from eating for 8 hours or more.  Be sure to  drink water and take your  medications the day of the test.    If you have further questions/concerns regarding the results, I would ask that you bring them to your next follow-up appointment with me and I would be happy to review them with you further.      Sincerely,      Maico Bell MD  Internal Medicine      "

## 2019-12-30 NOTE — TELEPHONE ENCOUNTER
"Requested Prescriptions   Pending Prescriptions Disp Refills     levothyroxine (SYNTHROID/LEVOTHROID) 112 MCG tablet [Pharmacy Med Name: LEVOTHYROXINE 112 MCG TABLET] 30 tablet 0     Sig: TAKE 1 TABLET BY MOUTH EVERY OTHER DAY (ODD NUMBER DAYS)   Last Written Prescription Date:  12/20/19  Last Fill Quantity: 90,  # refills: 3   Last office visit: 11/18/2019 with prescribing provider:  11/18/19   Future Office Visit:   Next 5 appointments (look out 90 days)    Jan 08, 2020  9:30 AM CST  Office Visit with SUKHWINDER Cleary CNM  [facility] ([facility]) [address] [address] [phone]             Thyroid Protocol Failed - 12/30/2019  2:02 AM        Failed - Normal TSH on file in past 12 months     Recent Labs   Lab Test 11/05/19  1028   TSH 4.55*              Passed - Patient is 12 years or older        Passed - Recent (12 mo) or future (30 days) visit within the authorizing provider's specialty     Patient has had an office visit with the authorizing provider or a provider within the authorizing providers department within the previous 12 mos or has a future within next 30 days. See \"Patient Info\" tab in inbasket, or \"Choose Columns\" in Meds & Orders section of the refill encounter.              Passed - Medication is active on med list        Passed - No active pregnancy on record     If patient is pregnant or has had a positive pregnancy test, please check TSH.          Passed - No positive pregnancy test in past 12 months     If patient is pregnant or has had a positive pregnancy test, please check TSH.            "

## 2020-01-07 NOTE — PROGRESS NOTES
SUBJECTIVE:                                                   Padmini Alan is a 59 year old who presents to clinic today for the following health issue(s):  Patient presents with:  Vaginal Problem: vaginal burning 1 1/2 weeks      HPI:  Padmini states that she had burning with urination approximately 3 weeks ago, states that burning resolved on its own 1 week later.  Padmini now states that she is feeling vaginal burning/itching for the past 1.5 weeks.  She also states that she has been having stress incontinence.  She states that she has not tried anything OTC for signs and symptoms, and that signs and symptoms seem to be lingering.  She wants to make sure that she does not have UTI or vaginal infection.  She is also due for a pap smear and would like that done today.     Patient's last menstrual period was 2004.  Menstrual History: menopausal: long time  Patient is not sexually active  .  Using menopause for contraception.   Health maintenance updated:  no  STI infx testing offered:  Declined    Last PHQ-9 score on record =   PHQ-9 SCORE 2020   PHQ-9 Total Score 6     Last GAD7 score on record =   CLEMENTE-7 SCORE 2020   Total Score -   Total Score 12     Alcohol Score = 0    Problem list and histories reviewed & adjusted, as indicated.  Additional history: as documented.    Patient Active Problem List   Diagnosis     Anxiety state     Other diseases of nasal cavity and sinuses     Hyperlipidemia LDL goal <130     ACP (advance care planning)     Hypothyroidism, unspecified type     Essential hypertension     Morbid obesity, unspecified obesity type (H)     Female stress incontinence     Past Surgical History:   Procedure Laterality Date     NO HISTORY OF SURGERY        Social History     Tobacco Use     Smoking status: Former Smoker     Last attempt to quit: 1987     Years since quittin.0     Smokeless tobacco: Never Used   Substance Use Topics     Alcohol use: Yes     Frequency: Monthly  "or less     Drinks per session: 1 or 2     Binge frequency: Never     Comment: socially      Problem (# of Occurrences) Relation (Name,Age of Onset)    Cardiovascular (1) Mother: palpitations    Heart Disease (1) Father: MI late 60s    Thyroid Disease (1) Father            Current Outpatient Medications   Medication Sig     fluticasone (FLONASE) 50 MCG/ACT nasal spray Spray 1-2 sprays into both nostrils daily     levothyroxine (SYNTHROID/LEVOTHROID) 125 MCG tablet TAKE 1 TABLET BY MOUTH EVERY MORNING     lisinopril-hydrochlorothiazide (PRINZIDE/ZESTORETIC) 20-12.5 MG tablet TAKE 1 TABLET BY MOUTH EVERY MORNING.     LORazepam (ATIVAN) 0.5 MG tablet 1 tab twice a day as needed for severe anxiety     No current facility-administered medications for this visit.      Allergies   Allergen Reactions     No Known Drug Allergies        ROS:  Genitourinary: Hot Flashes, Pelvic Pain and Vaginal Itching  Skin: Skin Dryness  Psychiatric: Anxiety and Difficulty Sleeping  12 point review of systems negative other than symptoms noted below.    OBJECTIVE:     /62   Pulse 84   Ht 1.6 m (5' 3\")   Wt 113.9 kg (251 lb)   LMP 12/23/2004   BMI 44.46 kg/m    Body mass index is 44.46 kg/m .    PHYSICAL EXAM:  Constitutional:  Appearance: Well nourished, well developed alert, in no acute distress  Gastrointestinal:  Abdominal Examination:  Abdomen nontender to palpation, tone normal without rigidity or guarding, no masses present, umbilicus without lesions; Liver/Spleen:  No hepatomegaly present, liver nontender to palpation; Hernias:  No hernias present; obese  Neurologic:  Mental Status:  Oriented X3.  Normal strength and tone, sensory exam grossly normal, mentation intact and speech normal.    Psychiatric:  Mentation appears normal and affect normal/bright.  Pelvic Exam:  External Genitalia:     Normal appearance for age, no discharge present, no tenderness present, no inflammatory lesions present, color normal  Vagina:  "    Normal vaginal vault without central or paravaginal defects, ATROPHIC  Bladder:     Nontender to palpation  Urethra:   Urethral Body:  Urethra palpation normal, urethra structural support normal   Urethral Meatus:  No erythema or lesions present  Cervix:     Appearance healthy, no lesions present, nontender to palpation, no bleeding present  Uterus:     Nontender to palpation, no masses present, position anteflexed, mobility: normal  Adnexa:     No adnexal tenderness present, no adnexal masses present  Perineum:     Perineum within normal limits, no evidence of trauma, no rashes or skin lesions present  Inguinal Lymph Nodes:     No lymphadenopathy present       In-Clinic Test Results:  Results for orders placed or performed in visit on 01/08/20 (from the past 24 hour(s))   Wet prep   Result Value Ref Range    Specimen Description Vagina     Wet Prep No yeast seen     Wet Prep No Trichomonas seen     Wet Prep No clue cells seen     Wet Prep Few  WBC'S seen      UA with Microscopic   Result Value Ref Range    Color Urine Yellow     Appearance Urine Clear     Glucose Urine Negative NEG^Negative mg/dL    Bilirubin Urine Negative NEG^Negative    Ketones Urine Negative NEG^Negative mg/dL    Specific Gravity Urine 1.015 1.003 - 1.035    pH Urine 6.0 5.0 - 7.0 pH    Protein Albumin Urine Negative NEG^Negative mg/dL    Urobilinogen Urine 0.2 0.2 - 1.0 EU/dL    Nitrite Urine Negative NEG^Negative    Blood Urine Negative NEG^Negative    Leukocyte Esterase Urine Negative NEG^Negative    Source Midstream Urine     WBC Urine 0 - 5 OTO5^0 - 5 /HPF    RBC Urine O - 2 OTO2^O - 2 /HPF       ASSESSMENT/PLAN:                                                        ICD-10-CM    1. Vaginal pain R10.2 Wet prep     Urine Culture Aerobic Bacterial   2. Dysuria R30.0 UA with Microscopic     Urine Culture Aerobic Bacterial   3. Vaginal itching N89.8 Wet prep     Urine Culture Aerobic Bacterial   4. Screening for malignant neoplasm of cervix  Z12.4 Pap imaged thin layer screen with HPV - recommended age 30 - 65 years (select HPV order below)     HPV High Risk Types DNA Cervical   5. Female stress incontinence N39.3    6. Atrophic vaginitis N95.2        COUNSELING:    -counseled on vaginitis and UTI prevention per AVS  -counseled on keeping clean/dry, making sure bladder is empty, keeping urination diary  -return to clinic of signs and symptoms do not improve  -will notify of lab results and will treat with appropriate medication if indicated    Eufemia PRETTY, SELINAM

## 2020-01-08 ENCOUNTER — OFFICE VISIT (OUTPATIENT)
Dept: OBGYN | Facility: CLINIC | Age: 60
End: 2020-01-08
Payer: COMMERCIAL

## 2020-01-08 VITALS
SYSTOLIC BLOOD PRESSURE: 114 MMHG | HEART RATE: 84 BPM | BODY MASS INDEX: 44.47 KG/M2 | WEIGHT: 251 LBS | HEIGHT: 63 IN | DIASTOLIC BLOOD PRESSURE: 62 MMHG

## 2020-01-08 DIAGNOSIS — R10.2 VAGINAL PAIN: Primary | ICD-10-CM

## 2020-01-08 DIAGNOSIS — Z12.4 SCREENING FOR MALIGNANT NEOPLASM OF CERVIX: ICD-10-CM

## 2020-01-08 DIAGNOSIS — R30.0 DYSURIA: ICD-10-CM

## 2020-01-08 DIAGNOSIS — N95.2 ATROPHIC VAGINITIS: ICD-10-CM

## 2020-01-08 DIAGNOSIS — N89.8 VAGINAL ITCHING: ICD-10-CM

## 2020-01-08 DIAGNOSIS — N39.3 FEMALE STRESS INCONTINENCE: ICD-10-CM

## 2020-01-08 LAB
ALBUMIN UR-MCNC: NEGATIVE MG/DL
APPEARANCE UR: CLEAR
BILIRUB UR QL STRIP: NEGATIVE
COLOR UR AUTO: YELLOW
GLUCOSE UR STRIP-MCNC: NEGATIVE MG/DL
HGB UR QL STRIP: NEGATIVE
KETONES UR STRIP-MCNC: NEGATIVE MG/DL
LEUKOCYTE ESTERASE UR QL STRIP: NEGATIVE
NITRATE UR QL: NEGATIVE
PH UR STRIP: 6 PH (ref 5–7)
RBC #/AREA URNS AUTO: NORMAL /HPF
SOURCE: NORMAL
SP GR UR STRIP: 1.01 (ref 1–1.03)
SPECIMEN SOURCE: NORMAL
UROBILINOGEN UR STRIP-ACNC: 0.2 EU/DL (ref 0.2–1)
WBC #/AREA URNS AUTO: NORMAL /HPF
WET PREP SPEC: NORMAL

## 2020-01-08 PROCEDURE — 81001 URINALYSIS AUTO W/SCOPE: CPT | Performed by: NURSE PRACTITIONER

## 2020-01-08 PROCEDURE — 87210 SMEAR WET MOUNT SALINE/INK: CPT | Performed by: NURSE PRACTITIONER

## 2020-01-08 PROCEDURE — G0145 SCR C/V CYTO,THINLAYER,RESCR: HCPCS | Performed by: NURSE PRACTITIONER

## 2020-01-08 PROCEDURE — 87086 URINE CULTURE/COLONY COUNT: CPT | Performed by: NURSE PRACTITIONER

## 2020-01-08 PROCEDURE — 87624 HPV HI-RISK TYP POOLED RSLT: CPT | Performed by: NURSE PRACTITIONER

## 2020-01-08 PROCEDURE — 99203 OFFICE O/P NEW LOW 30 MIN: CPT | Performed by: NURSE PRACTITIONER

## 2020-01-08 SDOH — HEALTH STABILITY: MENTAL HEALTH: HOW OFTEN DO YOU HAVE 6 OR MORE DRINKS ON ONE OCCASION?: NEVER

## 2020-01-08 SDOH — HEALTH STABILITY: MENTAL HEALTH: HOW OFTEN DO YOU HAVE A DRINK CONTAINING ALCOHOL?: MONTHLY OR LESS

## 2020-01-08 SDOH — HEALTH STABILITY: MENTAL HEALTH: HOW MANY STANDARD DRINKS CONTAINING ALCOHOL DO YOU HAVE ON A TYPICAL DAY?: 1 OR 2

## 2020-01-08 ASSESSMENT — PATIENT HEALTH QUESTIONNAIRE - PHQ9
SUM OF ALL RESPONSES TO PHQ QUESTIONS 1-9: 6
5. POOR APPETITE OR OVEREATING: MORE THAN HALF THE DAYS

## 2020-01-08 ASSESSMENT — ANXIETY QUESTIONNAIRES
3. WORRYING TOO MUCH ABOUT DIFFERENT THINGS: MORE THAN HALF THE DAYS
7. FEELING AFRAID AS IF SOMETHING AWFUL MIGHT HAPPEN: SEVERAL DAYS
6. BECOMING EASILY ANNOYED OR IRRITABLE: SEVERAL DAYS
IF YOU CHECKED OFF ANY PROBLEMS ON THIS QUESTIONNAIRE, HOW DIFFICULT HAVE THESE PROBLEMS MADE IT FOR YOU TO DO YOUR WORK, TAKE CARE OF THINGS AT HOME, OR GET ALONG WITH OTHER PEOPLE: SOMEWHAT DIFFICULT
5. BEING SO RESTLESS THAT IT IS HARD TO SIT STILL: SEVERAL DAYS
GAD7 TOTAL SCORE: 12
2. NOT BEING ABLE TO STOP OR CONTROL WORRYING: MORE THAN HALF THE DAYS
1. FEELING NERVOUS, ANXIOUS, OR ON EDGE: NEARLY EVERY DAY

## 2020-01-08 ASSESSMENT — MIFFLIN-ST. JEOR: SCORE: 1672.66

## 2020-01-08 NOTE — RESULT ENCOUNTER NOTE
Please call Padmini and let her know that the vaginal swab came back negative for any infection.  I am waiting for the urine culture result, which will be back in 2-3 days.  She will be notified of her urine culture results separately.     Thanks!    Eufemia PRETTY CNM

## 2020-01-08 NOTE — PATIENT INSTRUCTIONS
Patient Education     Dysuria with Uncertain Cause (Adult)    The urethra is the tube that allows urine to pass out of the body. In a woman, the urethra is the opening above the vagina. In men, the urethra is the opening on the tip of the penis. Dysuria is the feeling of pain or burning in the urethra when passing urine.  Dysuria can be caused by anything that irritates or inflames the urethra. An infection or chemical irritation can cause this reaction. A bladder infection is the most common cause of dysuria in adults. A urine test can diagnose this. A bladder infection needs antibiotic treatment.  Soaps, lotions, colognes and feminine hygiene products can cause dysuria. So can birth control jellies, creams, and foams. It will go away 1 to 3 days after using these irritants.  Sexually transmitted diseases (STDs) such as chlamydia or gonorrhea can cause dysuria. Your healthcare provider may take a culture sample. Your provider may start you on antibiotic medicine before the culture test returns.  In women who have gone through menopause, dysuria can be from dryness in the lining of the urethra. This can be treated with hormones. Dysuria becomes long-term (chronic) when it lasts for weeks or months. You may need to see a specialist (urologist) to diagnose and treat chronic dysuria.  Home care  These home care tips may help:    Don't use any chemicals or products that you think may be causing your symptoms.    If you were given a prescription medicine, take as directed. Be sure to take it until it is all used up.    If a culture was taken, don't have sex until you have been told that it is negative. This means you don't have an infection. Then follow your healthcare provider's advice to treat your condition.  If a culture was done and it is positive:    Both you and your sexual partner may need to be treated. This is true even if your partner has no symptoms.    Contact your healthcare provider or go to an urgent  [facility] clinic or the [facility] health department to be looked at and treated.    Don't have sex until both you and your partner(s) have finished all antibiotics and your healthcare provider says you are no longer contagious.    Learn about and use safe sex practices. The safest sex is with a partner who has tested negative and only has sex with you. Condoms can prevent STDs from spreading, but they aren't a guarantee.  Follow-up care  Follow up with your healthcare provider, or as advised. If a culture was taken, you may call as directed for the results. If you have an STD, follow up with your provider or the public health department for a complete STD screening, including HIV testing. For more information, contact CDC-INFO at 412-230-2608.  When to seek medical advice  Call your healthcare provider right away if any of these occur:    You aren't better after 3 days of treatment    Fever of 100.4 F (38 C) or higher, or as directed by your healthcare provider    Back or belly pain that gets worse    You can't urinate because of pain    New discharge from the urethra, vagina, or penis    Painful sores on the penis    Rash or joint pain    Painful lumps (lymph nodes) in the groin    Testicle pain or swelling of the scrotum  Date Last Reviewed: 11/1/2016 2000-2019 [employer]. [address] [place] [address], [place] [address]. All rights reserved. This information is not intended as a substitute for professional medical care. Always follow your healthcare professional's instructions.

## 2020-01-09 LAB
BACTERIA SPEC CULT: NORMAL
SPECIMEN SOURCE: NORMAL

## 2020-01-09 ASSESSMENT — ANXIETY QUESTIONNAIRES: GAD7 TOTAL SCORE: 12

## 2020-01-10 LAB
COPATH REPORT: NORMAL
PAP: NORMAL

## 2020-01-14 LAB
FINAL DIAGNOSIS: NORMAL
HPV HR 12 DNA CVX QL NAA+PROBE: NEGATIVE
HPV16 DNA SPEC QL NAA+PROBE: NEGATIVE
HPV18 DNA SPEC QL NAA+PROBE: NEGATIVE
SPECIMEN DESCRIPTION: NORMAL
SPECIMEN SOURCE CVX/VAG CYTO: NORMAL

## 2020-03-15 ENCOUNTER — HEALTH MAINTENANCE LETTER (OUTPATIENT)
Age: 60
End: 2020-03-15

## 2020-04-17 NOTE — LETTER
NOTE



SW and assigned CM met w/ pt and clarified pt's decision on rehab placement. PT agreed to be 
discharged to the arranged placement. "[facility]  [address] [address]  [phone]      8/12/2017       Padmini Alan  [address] [facility] [address]        Dear Padmini,  Here are your most recent lab results. Unless commented on below, mild variation of results  outside the normal range are not clinically signicant.    Resulted Orders   TSH with free T4 reflex   Result Value Ref Range    TSH 0.90 0.40 - 4.00 mU/L       Thyroid lab results were still normal  with the recent Levothyroxine dosage increase.  Continue current medication.  Please contact your pharmacy if you need further refills of your medication.  Call our appointment desk at [phone] to schedule a \"lab only\" to have your Basic Metabolic Panel and TSH checked non-fasting in 1 year.  Please make an appointment to see me  a few days after the lab test is drawn to discuss results and follow-up on your medical issues or earlier as needed.    If you have further questions/concerns regarding the results, I would ask that you bring them to your next follow-up appointment with me and I would be happy to review them with you further.      Sincerely,      Maico Bell MD  Internal Medicine      "

## 2020-10-31 DIAGNOSIS — I10 ESSENTIAL HYPERTENSION: ICD-10-CM

## 2020-10-31 RX ORDER — LISINOPRIL AND HYDROCHLOROTHIAZIDE 12.5; 2 MG/1; MG/1
TABLET ORAL
Qty: 90 TABLET | Refills: 0 | Status: SHIPPED | OUTPATIENT
Start: 2020-10-31 | End: 2021-03-12

## 2020-11-24 ENCOUNTER — TELEPHONE (OUTPATIENT)
Dept: OBGYN | Facility: CLINIC | Age: 60
End: 2020-11-24
Payer: COMMERCIAL

## 2020-11-24 NOTE — TELEPHONE ENCOUNTER
Panel Management Review    Date of last visit with a [facility] provider: Eufemia Burr  on 1/08/2020.  Date of next visit with a [facility] provider: None.    Health Maintenance List    Health Maintenance   Topic Date Due     PREVENTIVE CARE VISIT  1960     MAMMO SCREENING  1960     ZOSTER IMMUNIZATION (1 of 2) 02/23/2008     COLORECTAL CANCER SCREENING  09/02/2015     ADVANCE CARE PLANNING  04/01/2018     TSH W/FREE T4 REFLEX  12/30/2020     DTAP/TDAP/TD IMMUNIZATION (3 - Td) 08/25/2024     LIPID  11/05/2024     HPV TEST  01/08/2025     PAP  01/08/2025     HEPATITIS C SCREENING  Completed     PHQ-2  Completed     INFLUENZA VACCINE  Completed     HIV SCREENING  Addressed     Pneumococcal Vaccine: Pediatrics (0 to 5 Years) and At-Risk Patients (6 to 64 Years)  Aged Out     IPV IMMUNIZATION  Aged Out     MENINGITIS IMMUNIZATION  Aged Out     HEPATITIS B IMMUNIZATION  Aged Out       Composite cancer screening  Chart review shows that this patient is due/due soon for the following Mammogram  Lab Results   Component Value Date    PAP NIL 01/08/2020     Past Surgical History:   Procedure Laterality Date     NO HISTORY OF SURGERY         Is hysterectomy listed in surgical history? No   Is mastectomy listed in surgical history? No     Summary:    Patient is due/failing the following:   Mammogram    Action needed: Patient needs office visit for Annual.    Type of outreach:  Sent [employer] message.      Staff Signature:  Gianluca Brown CMA

## 2021-03-02 ENCOUNTER — DOCUMENTATION ONLY (OUTPATIENT)
Dept: LAB | Facility: CLINIC | Age: 61
End: 2021-03-02

## 2021-03-02 DIAGNOSIS — E03.9 HYPOTHYROIDISM, UNSPECIFIED TYPE: ICD-10-CM

## 2021-03-02 DIAGNOSIS — I10 ESSENTIAL HYPERTENSION: Primary | ICD-10-CM

## 2021-03-02 NOTE — LETTER
[facility]  [address] [address]  [phone]      3/4/2021       Padmini Alan  [address] [facility] [address]        Dear Padmini,  While refilling your prescription today, we noticed that you are due for a follow up appointment and fasting labs with Dr. Bell. We will refill your prescription for 30 days. Please call to schedule to a fasting lab appointment with in the next 3 weeks along with a separate follow up appointment with Dr. Bell a few days after labs are complete to review those results and address other medical issues as needed    Taking care of your health is important to us and we look forward to seeing you in the near future.  Please call us at [phone] or [phone] (or use [email or web address]) to schedule an appointment.     Please disregard this notice if you have already made an appointment.        Sincerely,      Maico Bell MD/Concepcion Fung, [facility]  Internal Medicine

## 2021-03-02 NOTE — TELEPHONE ENCOUNTER
Levothyroxine  Routing refill request to provider for review/approval because:  Labs not current:  TSH    TSH   Date Value Ref Range Status   12/30/2019 1.15 0.40 - 4.00 mU/L Final

## 2021-03-03 RX ORDER — LEVOTHYROXINE SODIUM 125 UG/1
TABLET ORAL
Qty: 30 TABLET | Refills: 0 | Status: SHIPPED | OUTPATIENT
Start: 2021-03-03 | End: 2021-03-12

## 2021-03-03 NOTE — TELEPHONE ENCOUNTER
Call pt. Femiue for labs and appt with MD.   Pt to have labs done  fasting in the next 3 weeks. See me a few days after labs are done to review results and to follow-up on his/her hypothyroidism. Pt to scheduled these appts. Medications will be refilled for 30 days only. Additional   more longterm refills will be addressed as appropriate  at the follow-up appointment with me in clinic.

## 2021-03-08 DIAGNOSIS — I10 ESSENTIAL HYPERTENSION: ICD-10-CM

## 2021-03-08 DIAGNOSIS — E03.9 HYPOTHYROIDISM, UNSPECIFIED TYPE: ICD-10-CM

## 2021-03-08 DIAGNOSIS — E78.5 HYPERLIPIDEMIA LDL GOAL <130: ICD-10-CM

## 2021-03-08 LAB
ALBUMIN SERPL-MCNC: 3.8 G/DL (ref 3.4–5)
ALP SERPL-CCNC: 55 U/L (ref 40–150)
ALT SERPL W P-5'-P-CCNC: 36 U/L (ref 0–50)
ANION GAP SERPL CALCULATED.3IONS-SCNC: 3 MMOL/L (ref 3–14)
AST SERPL W P-5'-P-CCNC: 20 U/L (ref 0–45)
BILIRUB SERPL-MCNC: 0.4 MG/DL (ref 0.2–1.3)
BUN SERPL-MCNC: 12 MG/DL (ref 7–30)
CALCIUM SERPL-MCNC: 10.7 MG/DL (ref 8.5–10.1)
CHLORIDE SERPL-SCNC: 108 MMOL/L (ref 94–109)
CHOLEST SERPL-MCNC: 233 MG/DL
CO2 SERPL-SCNC: 26 MMOL/L (ref 20–32)
CREAT SERPL-MCNC: 1.04 MG/DL (ref 0.52–1.04)
GFR SERPL CREATININE-BSD FRML MDRD: 57 ML/MIN/{1.73_M2}
GLUCOSE SERPL-MCNC: 95 MG/DL (ref 70–99)
HDLC SERPL-MCNC: 50 MG/DL
LDLC SERPL CALC-MCNC: 138 MG/DL
NONHDLC SERPL-MCNC: 183 MG/DL
POTASSIUM SERPL-SCNC: 4.3 MMOL/L (ref 3.4–5.3)
PROT SERPL-MCNC: 7.6 G/DL (ref 6.8–8.8)
SODIUM SERPL-SCNC: 137 MMOL/L (ref 133–144)
TRIGL SERPL-MCNC: 227 MG/DL
TSH SERPL DL<=0.005 MIU/L-ACNC: 2.13 MU/L (ref 0.4–4)

## 2021-03-08 PROCEDURE — 80061 LIPID PANEL: CPT | Performed by: INTERNAL MEDICINE

## 2021-03-08 PROCEDURE — 36415 COLL VENOUS BLD VENIPUNCTURE: CPT | Performed by: INTERNAL MEDICINE

## 2021-03-08 PROCEDURE — 80053 COMPREHEN METABOLIC PANEL: CPT | Performed by: INTERNAL MEDICINE

## 2021-03-08 PROCEDURE — 84443 ASSAY THYROID STIM HORMONE: CPT | Performed by: INTERNAL MEDICINE

## 2021-03-12 ENCOUNTER — OFFICE VISIT (OUTPATIENT)
Dept: INTERNAL MEDICINE | Facility: CLINIC | Age: 61
End: 2021-03-12
Payer: COMMERCIAL

## 2021-03-12 VITALS
HEIGHT: 64 IN | OXYGEN SATURATION: 95 % | RESPIRATION RATE: 16 BRPM | BODY MASS INDEX: 43.36 KG/M2 | WEIGHT: 254 LBS | TEMPERATURE: 97.3 F | DIASTOLIC BLOOD PRESSURE: 88 MMHG | SYSTOLIC BLOOD PRESSURE: 152 MMHG | HEART RATE: 88 BPM

## 2021-03-12 DIAGNOSIS — E66.01 MORBID OBESITY, UNSPECIFIED OBESITY TYPE (H): ICD-10-CM

## 2021-03-12 DIAGNOSIS — F32.0 MILD MAJOR DEPRESSION (H): ICD-10-CM

## 2021-03-12 DIAGNOSIS — F41.9 ANXIETY: ICD-10-CM

## 2021-03-12 DIAGNOSIS — I10 HYPERTENSION, UNSPECIFIED TYPE: ICD-10-CM

## 2021-03-12 DIAGNOSIS — E83.52 HYPERCALCEMIA: ICD-10-CM

## 2021-03-12 DIAGNOSIS — E78.5 HYPERLIPIDEMIA LDL GOAL <100: ICD-10-CM

## 2021-03-12 DIAGNOSIS — E03.9 HYPOTHYROIDISM, UNSPECIFIED TYPE: ICD-10-CM

## 2021-03-12 PROCEDURE — 99214 OFFICE O/P EST MOD 30 MIN: CPT | Performed by: INTERNAL MEDICINE

## 2021-03-12 RX ORDER — ESCITALOPRAM OXALATE 10 MG/1
10 TABLET ORAL DAILY
Qty: 30 TABLET | Refills: 11 | Status: SHIPPED | OUTPATIENT
Start: 2021-03-12 | End: 2021-05-03 | Stop reason: DRUGHIGH

## 2021-03-12 RX ORDER — LEVOTHYROXINE SODIUM 125 UG/1
TABLET ORAL
Qty: 90 TABLET | Refills: 3 | Status: SHIPPED | OUTPATIENT
Start: 2021-03-12 | End: 2022-03-15

## 2021-03-12 RX ORDER — AMLODIPINE AND BENAZEPRIL HYDROCHLORIDE 5; 40 MG/1; MG/1
1 CAPSULE ORAL DAILY
Qty: 30 CAPSULE | Refills: 11 | Status: SHIPPED | OUTPATIENT
Start: 2021-03-12 | End: 2021-05-03

## 2021-03-12 ASSESSMENT — ANXIETY QUESTIONNAIRES
3. WORRYING TOO MUCH ABOUT DIFFERENT THINGS: NEARLY EVERY DAY
7. FEELING AFRAID AS IF SOMETHING AWFUL MIGHT HAPPEN: NEARLY EVERY DAY
IF YOU CHECKED OFF ANY PROBLEMS ON THIS QUESTIONNAIRE, HOW DIFFICULT HAVE THESE PROBLEMS MADE IT FOR YOU TO DO YOUR WORK, TAKE CARE OF THINGS AT HOME, OR GET ALONG WITH OTHER PEOPLE: SOMEWHAT DIFFICULT
GAD7 TOTAL SCORE: 15
5. BEING SO RESTLESS THAT IT IS HARD TO SIT STILL: NOT AT ALL
6. BECOMING EASILY ANNOYED OR IRRITABLE: NOT AT ALL
1. FEELING NERVOUS, ANXIOUS, OR ON EDGE: NEARLY EVERY DAY
2. NOT BEING ABLE TO STOP OR CONTROL WORRYING: NEARLY EVERY DAY

## 2021-03-12 ASSESSMENT — MIFFLIN-ST. JEOR: SCORE: 1684.2

## 2021-03-12 ASSESSMENT — PATIENT HEALTH QUESTIONNAIRE - PHQ9
5. POOR APPETITE OR OVEREATING: NEARLY EVERY DAY
SUM OF ALL RESPONSES TO PHQ QUESTIONS 1-9: 13

## 2021-03-12 NOTE — PROGRESS NOTES
Assessment & Plan     ASSESSMENT:   1. Hypothyroidism, unspecified type  Controlled.  Continue current medication.  Repeat lab 1 year  - levothyroxine (SYNTHROID/LEVOTHROID) 125 MCG tablet; TAKE 1 TABLET BY MOUTH EVERY DAY IN THE MORNING  Dispense: 90 tablet; Refill: 3  - TSH with free T4 reflex; Future    2. Hypertension, unspecified type  Uncontrolled.  Hydrochlorothiazide may be contributing to hypercalcemia also.  Stop lisinopril/hydrochlorothiazide.  Start generic Lotrel and recheck blood pressure 1 month  - amLODIPine-benazepril (LOTREL) 5-40 MG capsule; Take 1 capsule by mouth daily  Dispense: 30 capsule; Refill: 11  - Basic metabolic panel; Future    3. Morbid obesity, unspecified obesity type (H)  Thyroid function normal.  Counseled regarding need for calorie/carbohydrate reduction.  If not improving in future, will consider possible phentermine if blood pressure better versus Topamax    4. Mild major depression (H)  Uncontrolled.  No suicidal ideation.  Will start generic Lexapro.  Discussed possible counseling in addition.  Patient will think about it but declines at this time  - escitalopram (LEXAPRO) 10 MG tablet; Take 1 tablet (10 mg) by mouth daily  Dispense: 30 tablet; Refill: 11    5. Anxiety   See #4. Sample plan  - escitalopram (LEXAPRO) 10 MG tablet; Take 1 tablet (10 mg) by mouth daily  Dispense: 30 tablet; Refill: 11    6. Hypercalcemia  ? related to hydrochlorathiazide vs other metabolic issue.  Will stop hydrochlorothiazide.  Recheck lab in a couple weeks  - Basic metabolic panel; Future  - Parathyroid Hormone Intact; Future    7. Hyperlipidemia LDL goal <100  10-year CAD risk at the point where statin would be indicated.  However to avoid starting too many medications at the same time, will defer starting statin today and readdress at follow-up appointment           PLAN:   Stop Lisinopril/hydrochlorathiazide    Start Amlodipine/Benazepril 5/40mg  capsule, 1 capsule daily for blood  pressure    Escitalopram 10mg tab, 1/2 tab daily in AM with food  for 4 days. Then 1 tab daily for anxiety and  depression   Let me know if wish to work with therapist in the future  Nonfasting  lab test in 2 weeks   See me in  approx 1 month after back from [place] for follow-up of blood pressure and mental health  Reduce calorie/carbohydrate (sugar, bread, potato, pasta, rice, alcohol etc)  intake in diet.  Increase color on your plate with fruits and vegetables. Increase  frequency of walking or other aerobic exercise as able (goal is daily)  Continue Levothyroxine  With your age and history of obesity, you would fit into phase 1B, tier 3 for the [place] Department of Health guidance regarding vaccinations (please refer to their website for the specific  tier information).   You may contact local pharmacies such as [facility] and [facility] or  the  [facility]  through their websites regarding scheduling a vaccination appointment now as they become available for your tier 3 qualification status.   Otherwise call [place] next Tuesday Am [phone] to see if they have started tier 3 vaccinations yet  Schedule mammogram appointment  We will discuss colonoscopy for  cancer screening further at next appointment. Pt to also  FIT stool kit in 2 weeks when comes for labs for other evaluation for now      (Chart documentation was completed, in part, with [employer] voice-recognition software. Even though reviewed, some grammatical, spelling, and word errors may remain.)    Maico Bell MD  Internal Medicine Department  [facility] SHABANA Washington is a 61 year old who presents for the following health issues  accompanied by herself:    HPI         Hypothyroidism Follow-up      Since last visit, patient describes the following symptoms: Weight stable, no hair loss, no skin changes, no constipation, no loose stools    Hypertension  Follow-up      Do you check your blood pressure regularly outside of the clinic? No     Are you following a low salt diet? Yes    Are your blood pressures ever more than 140 on the top number (systolic) OR more   than 90 on the bottom number (diastolic), for example 140/90? Unknown    Anxiety Follow-Up    How are you doing with your anxiety since your last visit? Worsened     Are you having other symptoms that might be associated with anxiety? Yes:  Biting lips, body aches    Have you had a significant life event? No     Are you feeling depressed? Yes:  Due to Anxiety    Do you have any concerns with your use of alcohol or other drugs? No    Social History     Tobacco Use     Smoking status: Former Smoker     Quit date: 1987     Years since quittin.1     Smokeless tobacco: Never Used   Substance Use Topics     Alcohol use: Yes     Frequency: Monthly or less     Drinks per session: 1 or 2     Binge frequency: Never     Comment: socially     Drug use: No     CLEMENTE-7 SCORE 2019 2020 3/12/2021   Total Score - - -   Total Score 2 12 15     PHQ 2020 3/12/2021   PHQ-9 Total Score 6 13   Q9: Thoughts of better off dead/self-harm past 2 weeks Not at all Not at all        Most recent lab results reviewed with pt.           No identified additional risks  The 10-year ASCVD risk score (Karla SANTANA Jr., et al., 2013) is: 9.7%    Values used to calculate the score:      Age: 61 years      Sex: Female      Is Non- : No      Diabetic: No      Tobacco smoker: No      Systolic Blood Pressure: 152 mmHg      Is BP treated: Yes      HDL Cholesterol: 50 mg/dL      Total Cholesterol: 233 mg/dL    Component      Latest Ref Rng & Units 2019 2019 3/8/2021   Sodium      133 - 144 mmol/L 137  137   Potassium      3.4 - 5.3 mmol/L 3.8  4.3   Chloride      94 - 109 mmol/L 105  108   Carbon Dioxide      20 - 32 mmol/L 27  26   Anion Gap      3 - 14 mmol/L 5  3   Glucose      70 - 99 mg/dL 88   95   Urea Nitrogen      7 - 30 mg/dL 17  12   Creatinine      0.52 - 1.04 mg/dL 0.97  1.04   GFR Estimate      >60 mL/min/1.73:m2 63  57 (L)   GFR Estimate If Black      >60 mL/min/1.73:m2 73  66   Calcium      8.5 - 10.1 mg/dL 9.9  10.7 (H)   Bilirubin Total      0.2 - 1.3 mg/dL 0.4  0.4   Albumin      3.4 - 5.0 g/dL 3.7  3.8   Protein Total      6.8 - 8.8 g/dL 7.1  7.6   Alkaline Phosphatase      40 - 150 U/L 58  55   ALT      0 - 50 U/L 29  36   AST      0 - 45 U/L 16  20   Cholesterol      <200 mg/dL 233 (H)  233 (H)   Triglycerides      <150 mg/dL 193 (H)  227 (H)   HDL Cholesterol      >49 mg/dL 54  50   LDL Cholesterol Calculated      <100 mg/dL 140 (H)  138 (H)   Non HDL Cholesterol      <130 mg/dL 179 (H)  183 (H)   T4 Free      0.76 - 1.46 ng/dL 1.17     TSH      0.40 - 4.00 mU/L 4.55 (H) 1.15 2.13     Denies chest pain, shortness of breath, abdominal pain, headache, vision changes or side effects with medications.  Denies muscle cramping  Has general anxiety and depression.  No suicidal ideation.  States has been anxious most of her life.  Some stress with Covid but in general, obvious any particular inciting event.   Describes recently going to a  [employer] rather than going to [employer]  that she had intended initially because [employer] would have involved driving 1 mile further which made her anxious  Occasional interruption in sleep due to her mind being busy thinking about things and worrying.  States she talks with her friends a lot about this.  Has not worked with a therapist/counselor before.  Not sure if she wishes to do that at this time.  Overdue for mammogram and colon cancer screening.  Not willing to schedule either at this time.  Pap up-to-date  Has not been exercising.  Not following any particular diet       Additional ROS:   Constitutional, HEENT, Cardiovascular, Pulmonary, GI and , Neuro, MSK and Psych review of systems/symptoms are otherwise negative or unchanged from previous, except  "as noted above.      OBJECTIVE:  BP (!) 152/88   Pulse 88   Temp 97.3  F (36.3  C) (Temporal)   Resp 16   Ht 1.613 m (5' 3.5\")   Wt 115.2 kg (254 lb)   LMP 12/23/2004   SpO2 95%   BMI 44.29 kg/m     Estimated body mass index is 44.29 kg/m  as calculated from the following:    Height as of this encounter: 1.613 m (5' 3.5\").    Weight as of this encounter: 115.2 kg (254 lb).     Neck: no adenopathy. Thyroid normal to palpation. No bruits  Pulm: Lungs clear to auscultation   CV: Regular rates and rhythm  GI: Soft, morbidly obese, nontender, Normal active bowel sounds, No hepatosplenomegaly or masses palpable  Ext: Peripheral pulses intact. No edema.  Gen: Slight anxious affect          "

## 2021-03-12 NOTE — PATIENT INSTRUCTIONS
Stop Lisinopril/hydrochlorathiazide    Start Amlodipine/Benazepril 5/40mg  capsule, 1 capsule daily for blood pressure    Escitalopram 10mg tab, 1/2 tab daily in AM with food  for 4 days. Then 1 tab daily for anxiety and  depression   Let me know if wish to work with therapist in the future  Nonfasting  lab test in 2 weeks   See me in  approx 1 month after back from [place] for follow-up of blood pressure and mental health  Reduce calorie/carbohydrate (sugar, bread, potato, pasta, rice, alcohol etc)  intake in diet.  Increase color on your plate with fruits and vegetables. Increase  frequency of walking or other aerobic exercise as able (goal is daily)  Continue Levothyroxine  With your age and history of obesity, you would fit into phase 1B, tier 3 for the [place] Department of Health guidance regarding vaccinations (please refer to their website for the specific  tier information).   You may contact local pharmacies such as [facility] or  the  [place] Department of Health  through their websites regarding scheduling a vaccination appointment now as they become available for your tier 3 qualification status.   Otherwise call [place] next Tuesday Am [phone] to see if they have started tier 3 vaccinations yet  Schedule mammogram appointment  We will discuss colonoscopy for  cancer screening further at next appointment. Pt to also  FIT stool kit in 2 weeks when comes for labs for other evaluation for now

## 2021-03-13 ASSESSMENT — ANXIETY QUESTIONNAIRES: GAD7 TOTAL SCORE: 15

## 2021-03-26 DIAGNOSIS — E83.52 HYPERCALCEMIA: ICD-10-CM

## 2021-03-26 DIAGNOSIS — I10 HYPERTENSION, UNSPECIFIED TYPE: ICD-10-CM

## 2021-03-26 LAB
ANION GAP SERPL CALCULATED.3IONS-SCNC: 4 MMOL/L (ref 3–14)
BUN SERPL-MCNC: 9 MG/DL (ref 7–30)
CALCIUM SERPL-MCNC: 9.9 MG/DL (ref 8.5–10.1)
CHLORIDE SERPL-SCNC: 110 MMOL/L (ref 94–109)
CO2 SERPL-SCNC: 25 MMOL/L (ref 20–32)
CREAT SERPL-MCNC: 0.95 MG/DL (ref 0.52–1.04)
GFR SERPL CREATININE-BSD FRML MDRD: 64 ML/MIN/{1.73_M2}
GLUCOSE SERPL-MCNC: 88 MG/DL (ref 70–99)
POTASSIUM SERPL-SCNC: 4 MMOL/L (ref 3.4–5.3)
PTH-INTACT SERPL-MCNC: 122 PG/ML (ref 18–80)
SODIUM SERPL-SCNC: 139 MMOL/L (ref 133–144)

## 2021-03-26 PROCEDURE — 83970 ASSAY OF PARATHORMONE: CPT | Performed by: INTERNAL MEDICINE

## 2021-03-26 PROCEDURE — 80048 BASIC METABOLIC PNL TOTAL CA: CPT | Performed by: INTERNAL MEDICINE

## 2021-03-26 PROCEDURE — 36415 COLL VENOUS BLD VENIPUNCTURE: CPT | Performed by: INTERNAL MEDICINE

## 2021-04-06 ENCOUNTER — MYC MEDICAL ADVICE (OUTPATIENT)
Dept: INTERNAL MEDICINE | Facility: CLINIC | Age: 61
End: 2021-04-06

## 2021-04-06 DIAGNOSIS — F41.1 ANXIETY STATE: ICD-10-CM

## 2021-04-07 RX ORDER — LORAZEPAM 0.5 MG/1
TABLET ORAL
Qty: 20 TABLET | Refills: 0 | Status: SHIPPED | OUTPATIENT
Start: 2021-04-07 | End: 2024-01-15

## 2021-05-03 ENCOUNTER — OFFICE VISIT (OUTPATIENT)
Dept: INTERNAL MEDICINE | Facility: CLINIC | Age: 61
End: 2021-05-03
Payer: COMMERCIAL

## 2021-05-03 VITALS
DIASTOLIC BLOOD PRESSURE: 72 MMHG | HEART RATE: 88 BPM | WEIGHT: 254 LBS | RESPIRATION RATE: 16 BRPM | TEMPERATURE: 97.2 F | OXYGEN SATURATION: 94 % | SYSTOLIC BLOOD PRESSURE: 126 MMHG | BODY MASS INDEX: 44.29 KG/M2

## 2021-05-03 DIAGNOSIS — I10 HYPERTENSION, UNSPECIFIED TYPE: ICD-10-CM

## 2021-05-03 DIAGNOSIS — E66.01 MORBID OBESITY, UNSPECIFIED OBESITY TYPE (H): ICD-10-CM

## 2021-05-03 DIAGNOSIS — F41.1 ANXIETY STATE: ICD-10-CM

## 2021-05-03 DIAGNOSIS — E21.3 HYPERPARATHYROIDISM (H): ICD-10-CM

## 2021-05-03 LAB
ANION GAP SERPL CALCULATED.3IONS-SCNC: 2 MMOL/L (ref 3–14)
BUN SERPL-MCNC: 12 MG/DL (ref 7–30)
CALCIUM SERPL-MCNC: 9.7 MG/DL (ref 8.5–10.1)
CHLORIDE SERPL-SCNC: 109 MMOL/L (ref 94–109)
CO2 SERPL-SCNC: 27 MMOL/L (ref 20–32)
CREAT SERPL-MCNC: 1 MG/DL (ref 0.52–1.04)
DEPRECATED CALCIDIOL+CALCIFEROL SERPL-MC: 31 UG/L (ref 20–75)
GFR SERPL CREATININE-BSD FRML MDRD: 60 ML/MIN/{1.73_M2}
GLUCOSE SERPL-MCNC: 93 MG/DL (ref 70–99)
POTASSIUM SERPL-SCNC: 4.4 MMOL/L (ref 3.4–5.3)
PTH-INTACT SERPL-MCNC: 114 PG/ML (ref 18–80)
SODIUM SERPL-SCNC: 138 MMOL/L (ref 133–144)

## 2021-05-03 PROCEDURE — 80048 BASIC METABOLIC PNL TOTAL CA: CPT | Performed by: INTERNAL MEDICINE

## 2021-05-03 PROCEDURE — 99214 OFFICE O/P EST MOD 30 MIN: CPT | Performed by: INTERNAL MEDICINE

## 2021-05-03 PROCEDURE — 82306 VITAMIN D 25 HYDROXY: CPT | Performed by: INTERNAL MEDICINE

## 2021-05-03 PROCEDURE — 83970 ASSAY OF PARATHORMONE: CPT | Performed by: INTERNAL MEDICINE

## 2021-05-03 PROCEDURE — 36415 COLL VENOUS BLD VENIPUNCTURE: CPT | Performed by: INTERNAL MEDICINE

## 2021-05-03 RX ORDER — ESCITALOPRAM OXALATE 20 MG/1
20 TABLET ORAL DAILY
Qty: 30 TABLET | Refills: 11 | Status: SHIPPED | OUTPATIENT
Start: 2021-05-03 | End: 2022-02-15

## 2021-05-03 RX ORDER — AMLODIPINE AND BENAZEPRIL HYDROCHLORIDE 5; 40 MG/1; MG/1
1 CAPSULE ORAL DAILY
Qty: 90 CAPSULE | Refills: 3 | Status: SHIPPED | OUTPATIENT
Start: 2021-05-03 | End: 2022-05-31

## 2021-05-03 ASSESSMENT — ANXIETY QUESTIONNAIRES
5. BEING SO RESTLESS THAT IT IS HARD TO SIT STILL: NOT AT ALL
2. NOT BEING ABLE TO STOP OR CONTROL WORRYING: NOT AT ALL
3. WORRYING TOO MUCH ABOUT DIFFERENT THINGS: NOT AT ALL
1. FEELING NERVOUS, ANXIOUS, OR ON EDGE: MORE THAN HALF THE DAYS
IF YOU CHECKED OFF ANY PROBLEMS ON THIS QUESTIONNAIRE, HOW DIFFICULT HAVE THESE PROBLEMS MADE IT FOR YOU TO DO YOUR WORK, TAKE CARE OF THINGS AT HOME, OR GET ALONG WITH OTHER PEOPLE: NOT DIFFICULT AT ALL
6. BECOMING EASILY ANNOYED OR IRRITABLE: NOT AT ALL
GAD7 TOTAL SCORE: 3
7. FEELING AFRAID AS IF SOMETHING AWFUL MIGHT HAPPEN: NOT AT ALL

## 2021-05-03 ASSESSMENT — PATIENT HEALTH QUESTIONNAIRE - PHQ9
5. POOR APPETITE OR OVEREATING: SEVERAL DAYS
SUM OF ALL RESPONSES TO PHQ QUESTIONS 1-9: 6

## 2021-05-03 NOTE — PATIENT INSTRUCTIONS
Increase Escitalopram to 20mg tab, 1 tab daily  for anxiety  Continue other meds.  Prescriptions refilled.    Labs today as ordered  Reduce calorie/carbohydrate (sugar, bread, potato, pasta, rice, alcohol etc)  intake in diet.  Increase color on your plate with fruits and vegetables. Increase  frequency of walking or other aerobic exercise as able (goal is daily)  Update me re: mood in [employer] message in 3-4 weeks or earlier if side effects with medication dose change  Covid vaccine tomorrow as scheduled

## 2021-05-03 NOTE — PROGRESS NOTES
ASSESSMENT:   1. Hypertension, unspecified type  Controlled.  Continue current medication  - amLODIPine-benazepril (LOTREL) 5-40 MG capsule; Take 1 capsule by mouth daily  Dispense: 90 capsule; Refill: 3    2. Anxiety state    CLEMENTE and PHQ scores both much better but patient still has anxiety feeling later part of the day.  Will therefore try increasing Escitalopram to 20 mg daily.  If not improving in the future, will consider going back to 10 mg daily with addition of buspirone.  Patient will update me in a few weeks through [employer]  - escitalopram (LEXAPRO) 20 MG tablet; Take 1 tablet (20 mg) by mouth daily  Dispense: 30 tablet; Refill: 11    3. Hyperparathyroidism (H)  Labs as below.  Needs PTH rechecked.  We will also assess vitamin D status.  Calcium level had improved to upper normal and needs recheck  - Basic metabolic panel  - Vitamin D Deficiency  - Parathyroid Hormone Intact    4. Morbid obesity, unspecified obesity type (H)  Weight stable from last visit after prior weight gain.  Thyroid function normal.  Counseled regarding calorie/carbohydrate reduction      PLAN:  Increase Escitalopram to 20mg tab, 1 tab daily  for anxiety  Continue other meds.  Prescriptions refilled.    Labs today as ordered  Reduce calorie/carbohydrate (sugar, bread, potato, pasta, rice, alcohol etc)  intake in diet.  Increase color on your plate with fruits and vegetables. Increase  frequency of walking or other aerobic exercise as able (goal is daily)  Update me re: mood in [employer] message in 3-4 weeks or earlier if side effects with medication dose change  Covid vaccine tomorrow as scheduled        (Chart documentation was completed, in part, with [employer] voice-recognition software. Even though reviewed, some grammatical, spelling, and word errors may remain.)    Maico Bell MD  Internal Medicine Department  [facility]      Michelle Washington is a 61 year old who presents for the following  health issues     HPI     Depression and Anxiety Follow-Up    How are you doing with your depression since your last visit? Improved still some     How are you doing with your anxiety since your last visit?  Improved still some    Are you having other symptoms that might be associated with depression or anxiety? No    Have you had a significant life event? OTHER: Fatnre-in-law Passing     Do you have any concerns with your use of alcohol or other drugs? No    Social History     Tobacco Use     Smoking status: Former Smoker     Quit date: 1987     Years since quittin.3     Smokeless tobacco: Never Used   Substance Use Topics     Alcohol use: Yes     Frequency: Monthly or less     Drinks per session: 1 or 2     Binge frequency: Never     Comment: socially     Drug use: No     PHQ 2020 3/12/2021 5/3/2021   PHQ-9 Total Score 6 13 6   Q9: Thoughts of better off dead/self-harm past 2 weeks Not at all Not at all Not at all     CLEMENTE-7 SCORE 2020 3/12/2021 5/3/2021   Total Score - - -   Total Score 12 15 3     Last PHQ-9 5/3/2021   1.  Little interest or pleasure in doing things 0   2.  Feeling down, depressed, or hopeless 0   3.  Trouble falling or staying asleep, or sleeping too much 3   4.  Feeling tired or having little energy 3   5.  Poor appetite or overeating 0   6.  Feeling bad about yourself 0   7.  Trouble concentrating 0   8.  Moving slowly or restless 0   Q9: Thoughts of better off dead/self-harm past 2 weeks 0   PHQ-9 Total Score 6   Difficulty at work, home, or with people Not difficult at all     CLEMENTE-7  5/3/2021   1. Feeling nervous, anxious, or on edge 2   2. Not being able to stop or control worrying 0   3. Worrying too much about different things 0   4. Trouble relaxing 1   5. Being so restless that it is hard to sit still 0   6. Becoming easily annoyed or irritable 0   7. Feeling afraid, as if something awful might happen 0   CLEMENTE-7 Total Score 3   If you checked any problems, how difficult  have they made it for you to do your work, take care of things at home, or get along with other people? Not difficult at all       Suicide Assessment Five-step Evaluation and Treatment (SAFE-T)    Hypertension Follow-up      Do you check your blood pressure regularly outside of the clinic? No     Are you following a low salt diet? Yes    Are your blood pressures ever more than 140 on the top number (systolic) OR more   than 90 on the bottom number (diastolic), for example 140/90? Unknown       Most recent lab results reviewed with pt.       Mood better but still some stress and tensing jaw later part of the day  Denies chest pain, shortness of breath, abdominal pain, headache, vision changes or side effects with medications.  Weight stable after previous recent weight gain    Component      Latest Ref Rng & Units 3/8/2021 3/26/2021   Sodium      133 - 144 mmol/L 137 139   Potassium      3.4 - 5.3 mmol/L 4.3 4.0   Chloride      94 - 109 mmol/L 108 110 (H)   Carbon Dioxide      20 - 32 mmol/L 26 25   Anion Gap      3 - 14 mmol/L 3 4   Glucose      70 - 99 mg/dL 95 88   Urea Nitrogen      7 - 30 mg/dL 12 9   Creatinine      0.52 - 1.04 mg/dL 1.04 0.95   GFR Estimate      >60 mL/min/1.73:m2 57 (L) 64   GFR Estimate If Black      >60 mL/min/1.73:m2 66 74   Calcium      8.5 - 10.1 mg/dL 10.7 (H) 9.9   Bilirubin Total      0.2 - 1.3 mg/dL 0.4    Albumin      3.4 - 5.0 g/dL 3.8    Protein Total      6.8 - 8.8 g/dL 7.6    Alkaline Phosphatase      40 - 150 U/L 55    ALT      0 - 50 U/L 36    AST      0 - 45 U/L 20    Cholesterol      <200 mg/dL 233 (H)    Triglycerides      <150 mg/dL 227 (H)    HDL Cholesterol      >49 mg/dL 50    LDL Cholesterol Calculated      <100 mg/dL 138 (H)    Non HDL Cholesterol      <130 mg/dL 183 (H)    TSH      0.40 - 4.00 mU/L 2.13    Parathyroid Hormone Intact      18 - 80 pg/mL  122 (H)          Additional ROS:   Constitutional, HEENT, Cardiovascular, Pulmonary, GI and , Neuro, MSK and Psych  "review of systems/symptoms are otherwise negative or unchanged from previous, except as noted above.      OBJECTIVE:  /72   Pulse 88   Temp 97.2  F (36.2  C) (Temporal)   Resp 16   Wt 115.2 kg (254 lb)   LMP 12/23/2004   SpO2 94%   BMI 44.29 kg/m     Estimated body mass index is 44.29 kg/m  as calculated from the following:    Height as of 3/12/21: 1.613 m (5' 3.5\").    Weight as of this encounter: 115.2 kg (254 lb).     Neck: no adenopathy. Thyroid normal to palpation. No bruits  Pulm: Lungs clear to auscultation   CV: Regular rates and rhythm  GI: Soft, obese, nontender, Normal active bowel sounds, No hepatosplenomegaly or masses palpable  Ext: Peripheral pulses intact. No edema.   Gen: Minimal  anxious affect.  Answering questions appropriately.  Normal dress, thought content controlled.  Continue current medication and eye contact              "

## 2021-05-04 ENCOUNTER — IMMUNIZATION (OUTPATIENT)
Dept: NURSING | Facility: CLINIC | Age: 61
End: 2021-05-04
Payer: COMMERCIAL

## 2021-05-04 PROCEDURE — 0001A PR COVID VAC PFIZER DIL RECON 30 MCG/0.3 ML IM: CPT

## 2021-05-04 PROCEDURE — 91300 PR COVID VAC PFIZER DIL RECON 30 MCG/0.3 ML IM: CPT

## 2021-05-04 ASSESSMENT — ANXIETY QUESTIONNAIRES: GAD7 TOTAL SCORE: 3

## 2021-05-08 ENCOUNTER — HEALTH MAINTENANCE LETTER (OUTPATIENT)
Age: 61
End: 2021-05-08

## 2021-05-25 ENCOUNTER — IMMUNIZATION (OUTPATIENT)
Dept: NURSING | Facility: CLINIC | Age: 61
End: 2021-05-25
Attending: INTERNAL MEDICINE
Payer: COMMERCIAL

## 2021-05-25 PROCEDURE — 91300 PR COVID VAC PFIZER DIL RECON 30 MCG/0.3 ML IM: CPT

## 2021-05-25 PROCEDURE — 0002A PR COVID VAC PFIZER DIL RECON 30 MCG/0.3 ML IM: CPT

## 2021-05-28 NOTE — TELEPHONE ENCOUNTER
Panel Management Review    Patient Active Problem List   Diagnosis     Anxiety state     Other diseases of nasal cavity and sinuses     Hyperlipidemia LDL goal <130     ACP (advance care planning)     Hypothyroidism, unspecified type     Essential hypertension     Morbid obesity, unspecified obesity type (H)       Patient has the following on her problem list:     Hypertension   Last three blood pressure readings:  BP Readings from Last 3 Encounters:   08/09/18 120/70   06/16/17 118/70   08/25/16 116/70     Blood pressure: Passed    HTN Guidelines:  Age 18-59 BP range:  Less than 140/90  Age 60-85 with Diabetes:  Less than 140/90  Age 60-85 without Diabetes:  less than 150/90      Composite cancer screening  Chart review shows that this patient is due/due soon for the following Pap Smear, Mammogram and Colonoscopy  Summary:    Patient is due/failing the following:   COLONOSCOPY, MAMMOGRAM and PAP    Action needed:Call to raquel    Type of outreach:    Sent letter.    Questions for provider review:    None                                                                                                                                    Concepcion Fung, CMA                  None

## 2021-07-28 ENCOUNTER — OFFICE VISIT (OUTPATIENT)
Dept: URGENT CARE | Facility: URGENT CARE | Age: 61
End: 2021-07-28
Payer: COMMERCIAL

## 2021-07-28 VITALS
BODY MASS INDEX: 44.11 KG/M2 | DIASTOLIC BLOOD PRESSURE: 80 MMHG | TEMPERATURE: 98.9 F | RESPIRATION RATE: 20 BRPM | SYSTOLIC BLOOD PRESSURE: 130 MMHG | HEART RATE: 87 BPM | OXYGEN SATURATION: 96 % | WEIGHT: 253 LBS

## 2021-07-28 DIAGNOSIS — R21 RASH AND NONSPECIFIC SKIN ERUPTION: Primary | ICD-10-CM

## 2021-07-28 DIAGNOSIS — L29.9 ITCHING: ICD-10-CM

## 2021-07-28 PROCEDURE — 99214 OFFICE O/P EST MOD 30 MIN: CPT | Performed by: PHYSICIAN ASSISTANT

## 2021-07-28 RX ORDER — METHYLPREDNISOLONE 4 MG
TABLET, DOSE PACK ORAL
Qty: 21 TABLET | Refills: 0 | Status: SHIPPED | OUTPATIENT
Start: 2021-07-28 | End: 2022-03-16

## 2021-07-28 RX ORDER — CETIRIZINE HYDROCHLORIDE 10 MG/1
10 TABLET ORAL DAILY
Qty: 30 TABLET | Refills: 0 | Status: SHIPPED | OUTPATIENT
Start: 2021-07-28 | End: 2023-11-02

## 2021-07-28 RX ORDER — HYDROCORTISONE 10 MG/ML
LOTION TOPICAL 2 TIMES DAILY
Qty: 118 ML | Refills: 1 | Status: SHIPPED | OUTPATIENT
Start: 2021-07-28 | End: 2024-01-15

## 2021-07-29 NOTE — PROGRESS NOTES
"    Assessment & Plan     Rash and nonspecific skin eruption  Medrol and zyrtec for rash  May try course of hydrocortisone lotion  If symptoms fail to improve on exam then advise following up with derm  - methylPREDNISolone (MEDROL DOSEPAK) 4 MG tablet therapy pack; Follow package instructions  - cetirizine (ZYRTEC) 10 MG tablet; Take 1 tablet (10 mg) by mouth daily  - hydrocortisone (CORTIZONE 10) 1 % external lotion; Apply topically 2 times daily  - Adult Dermatology Referral; Future    Itching  Medrol and zyrtec for itching  - methylPREDNISolone (MEDROL DOSEPAK) 4 MG tablet therapy pack; Follow package instructions  - cetirizine (ZYRTEC) 10 MG tablet; Take 1 tablet (10 mg) by mouth daily  - hydrocortisone (CORTIZONE 10) 1 % external lotion; Apply topically 2 times daily    Review of external notes as documented elsewhere in note         BMI:   Estimated body mass index is 44.11 kg/m  as calculated from the following:    Height as of 3/12/21: 1.613 m (5' 3.5\").    Weight as of this encounter: 114.8 kg (253 lb).       CONSULTATION/REFERRAL to dermatology      Fran Hickey PA-C  [facility] SHABANA Washington is a 61 year old who presents for the following health issues      HPI     Itchy rash on exposed areas  Some areas are blistered from reaction      Review of Systems   Constitutional, HEENT, cardiovascular, pulmonary, gi and gu systems are negative, except as otherwise noted.      Objective    /80   Pulse 87   Temp 98.9  F (37.2  C)   Resp 20   Wt 114.8 kg (253 lb)   LMP 12/23/2004   SpO2 96%   BMI 44.11 kg/m    Body mass index is 44.11 kg/m .  Physical Exam   GENERAL: healthy, alert and no distress  ABDOMEN: soft, nontender, no hepatosplenomegaly, no masses and bowel sounds normal  MS: Positive for rash on extermities upper and lower, with excoriations  SKIN: Positive for rash on arms and legs  NEURO: Normal strength and tone, mentation intact and speech " normal  PSYCH: mentation appears normal, affect normal/bright

## 2021-10-23 ENCOUNTER — HEALTH MAINTENANCE LETTER (OUTPATIENT)
Age: 61
End: 2021-10-23

## 2022-02-13 DIAGNOSIS — F41.1 ANXIETY STATE: ICD-10-CM

## 2022-02-15 RX ORDER — ESCITALOPRAM OXALATE 20 MG/1
TABLET ORAL
Qty: 90 TABLET | Refills: 0 | Status: SHIPPED | OUTPATIENT
Start: 2022-02-15 | End: 2022-05-15

## 2022-02-15 NOTE — TELEPHONE ENCOUNTER
Prescription approved per [facility] Refill Protocol.  Fara Lucas, RN  [facility] Triage Nurse

## 2022-03-14 DIAGNOSIS — E21.3 HYPERPARATHYROIDISM (H): Primary | ICD-10-CM

## 2022-03-14 DIAGNOSIS — E03.9 HYPOTHYROIDISM, UNSPECIFIED TYPE: ICD-10-CM

## 2022-03-14 DIAGNOSIS — I10 ESSENTIAL HYPERTENSION: ICD-10-CM

## 2022-03-14 DIAGNOSIS — Z13.6 CARDIOVASCULAR SCREENING; LDL GOAL LESS THAN 100: ICD-10-CM

## 2022-03-16 RX ORDER — LEVOTHYROXINE SODIUM 125 UG/1
TABLET ORAL
Qty: 90 TABLET | Refills: 0 | Status: SHIPPED | OUTPATIENT
Start: 2022-03-16 | End: 2022-06-16

## 2022-03-17 NOTE — TELEPHONE ENCOUNTER
Patient last seen May 2021.  Due for fasting labs.  Medication refilled for 90 days.  Call patient and assist in scheduling a fasting lab appointment in the next couple weeks and follow-up with me in clinic within the next 2 months.

## 2022-04-06 ENCOUNTER — LAB (OUTPATIENT)
Dept: LAB | Facility: CLINIC | Age: 62
End: 2022-04-06
Payer: COMMERCIAL

## 2022-04-06 DIAGNOSIS — I10 ESSENTIAL HYPERTENSION: ICD-10-CM

## 2022-04-06 DIAGNOSIS — Z13.6 CARDIOVASCULAR SCREENING; LDL GOAL LESS THAN 100: ICD-10-CM

## 2022-04-06 DIAGNOSIS — E03.9 HYPOTHYROIDISM, UNSPECIFIED TYPE: ICD-10-CM

## 2022-04-06 DIAGNOSIS — E21.3 HYPERPARATHYROIDISM (H): ICD-10-CM

## 2022-04-06 LAB
ALBUMIN SERPL-MCNC: 3.8 G/DL (ref 3.4–5)
ALP SERPL-CCNC: 69 U/L (ref 40–150)
ALT SERPL W P-5'-P-CCNC: 25 U/L (ref 0–50)
ANION GAP SERPL CALCULATED.3IONS-SCNC: 10 MMOL/L (ref 3–14)
AST SERPL W P-5'-P-CCNC: 16 U/L (ref 0–45)
BILIRUB SERPL-MCNC: 0.4 MG/DL (ref 0.2–1.3)
BUN SERPL-MCNC: 12 MG/DL (ref 7–30)
CALCIUM SERPL-MCNC: 9.9 MG/DL (ref 8.5–10.1)
CHLORIDE BLD-SCNC: 109 MMOL/L (ref 94–109)
CHOLEST SERPL-MCNC: 205 MG/DL
CO2 SERPL-SCNC: 20 MMOL/L (ref 20–32)
CREAT SERPL-MCNC: 0.9 MG/DL (ref 0.52–1.04)
DEPRECATED CALCIDIOL+CALCIFEROL SERPL-MC: 30 UG/L (ref 20–75)
FASTING STATUS PATIENT QL REPORTED: YES
GFR SERPL CREATININE-BSD FRML MDRD: 71 ML/MIN/1.73M2
GLUCOSE BLD-MCNC: 105 MG/DL (ref 70–99)
HDLC SERPL-MCNC: 51 MG/DL
LDLC SERPL CALC-MCNC: 119 MG/DL
NONHDLC SERPL-MCNC: 154 MG/DL
POTASSIUM BLD-SCNC: 3.8 MMOL/L (ref 3.4–5.3)
PROT SERPL-MCNC: 7.4 G/DL (ref 6.8–8.8)
PTH-INTACT SERPL-MCNC: 143 PG/ML (ref 18–80)
SODIUM SERPL-SCNC: 139 MMOL/L (ref 133–144)
TRIGL SERPL-MCNC: 174 MG/DL
TSH SERPL DL<=0.005 MIU/L-ACNC: 0.66 MU/L (ref 0.4–4)

## 2022-04-06 PROCEDURE — 83970 ASSAY OF PARATHORMONE: CPT

## 2022-04-06 PROCEDURE — 80061 LIPID PANEL: CPT

## 2022-04-06 PROCEDURE — 36415 COLL VENOUS BLD VENIPUNCTURE: CPT

## 2022-04-06 PROCEDURE — 80053 COMPREHEN METABOLIC PANEL: CPT

## 2022-04-06 PROCEDURE — 82306 VITAMIN D 25 HYDROXY: CPT

## 2022-04-06 PROCEDURE — 84443 ASSAY THYROID STIM HORMONE: CPT

## 2022-04-09 ENCOUNTER — HEALTH MAINTENANCE LETTER (OUTPATIENT)
Age: 62
End: 2022-04-09

## 2022-05-13 DIAGNOSIS — F41.1 ANXIETY STATE: ICD-10-CM

## 2022-05-13 NOTE — LETTER
GENEVIEVE [facility]  [address] [address]  [phone]  May 18, 2022  Padmini Alan  [facility] [place] [id number]    Dear Padmini,    I am contacting you regarding the refill request we received for you. After reviewing your chart it looks like you are overdue for your annual and for a med check. Please call [phone] or schedule this through my chart to continue to receive refills. If you anticipate running out before your appointment let us know and we can send in a miya refill.       Thank you,     GENEVIEVE [facility] nursing staff

## 2022-05-15 RX ORDER — ESCITALOPRAM OXALATE 20 MG/1
TABLET ORAL
Qty: 90 TABLET | Refills: 0 | Status: SHIPPED | OUTPATIENT
Start: 2022-05-15 | End: 2022-08-10

## 2022-05-15 NOTE — TELEPHONE ENCOUNTER
Over 1 year since patient seen in clinic.  Medication refilled for 90 days.  Call patient and schedule a follow-up appointment with me in clinic in the next 3 months

## 2022-05-17 ENCOUNTER — OFFICE VISIT (OUTPATIENT)
Dept: URGENT CARE | Facility: URGENT CARE | Age: 62
End: 2022-05-17
Payer: COMMERCIAL

## 2022-05-17 VITALS
HEART RATE: 68 BPM | OXYGEN SATURATION: 97 % | TEMPERATURE: 96.3 F | SYSTOLIC BLOOD PRESSURE: 142 MMHG | DIASTOLIC BLOOD PRESSURE: 83 MMHG

## 2022-05-17 DIAGNOSIS — L21.9 SEBORRHEIC DERMATITIS: ICD-10-CM

## 2022-05-17 DIAGNOSIS — L30.9 PERIORBITAL DERMATITIS: Primary | ICD-10-CM

## 2022-05-17 PROCEDURE — 99213 OFFICE O/P EST LOW 20 MIN: CPT | Performed by: FAMILY MEDICINE

## 2022-05-17 RX ORDER — METHYLPREDNISOLONE 4 MG
TABLET, DOSE PACK ORAL
Qty: 21 TABLET | Refills: 0 | Status: SHIPPED | OUTPATIENT
Start: 2022-05-17 | End: 2022-07-14

## 2022-05-17 NOTE — PROGRESS NOTES
SUBJECTIVE: Padmini Alan is a 62 year old female presenting with a chief complaint of itchy rash periorbital.  Onset of symptoms was 3 week(s) ago.  Course of illness is same.      Past Medical History:   Diagnosis Date     Anxiety state, unspecified 3/03     CHRONIC MAXILLARY SINUSITIS 3/03     Hyperlipidemia LDL goal <130 10/25/2011     LACTOSE INTOLERANCE, MILD      Morbid obesity, unspecified obesity type (H) 2017     Obesity, unspecified      Palpitations      Unspecified essential hypertension      Unspecified hypothyroidism      Allergies   Allergen Reactions     No Known Drug Allergies      Social History     Tobacco Use     Smoking status: Former Smoker     Quit date: 1987     Years since quittin.3     Smokeless tobacco: Never Used   Substance Use Topics     Alcohol use: Yes     Comment: socially       ROS:  SKIN: no rash  GI: no vomiting    OBJECTIVE:  BP (!) 142/83 (BP Location: Left arm, Patient Position: Sitting, Cuff Size: Adult Large)   Pulse 68   Temp (!) 96.3  F (35.7  C) (Tympanic)   LMP 2004   SpO2 97% GENERAL APPEARANCE: healthy, alert and no distress  EYES: EOMI,  PERRL, conjunctiva clear  SKIN: periorbital red rash bilateral      ICD-10-CM    1. Periorbital dermatitis  L30.9 methylPREDNISolone (MEDROL DOSEPAK) 4 MG tablet therapy pack   2. Seborrheic dermatitis  L21.9        Fluids/Rest, f/u if worse/not any better

## 2022-05-28 DIAGNOSIS — I10 HYPERTENSION, UNSPECIFIED TYPE: ICD-10-CM

## 2022-05-31 NOTE — TELEPHONE ENCOUNTER
Routing refill request to provider for review/approval because:  Failed protocol due to:   Patient needs to be seen. Last OV 1 year ago.  BP Readings from Last 3 Encounters:   05/17/22 (!) 142/83   07/28/21 130/80   05/03/21 126/72     Pt scheduled for an appt 7/26/22. Med pended for 90 days.    Fara Lucas RN

## 2022-06-01 RX ORDER — AMLODIPINE AND BENAZEPRIL HYDROCHLORIDE 5; 40 MG/1; MG/1
CAPSULE ORAL
Qty: 90 CAPSULE | Refills: 0 | Status: SHIPPED | OUTPATIENT
Start: 2022-06-01 | End: 2022-08-28

## 2022-06-01 NOTE — TELEPHONE ENCOUNTER
Appointment with me scheduled for July.  We will review blood pressure further at that time.  Medication refilled 90 days

## 2022-06-04 ENCOUNTER — HEALTH MAINTENANCE LETTER (OUTPATIENT)
Age: 62
End: 2022-06-04

## 2022-06-15 DIAGNOSIS — E03.9 HYPOTHYROIDISM, UNSPECIFIED TYPE: ICD-10-CM

## 2022-06-16 RX ORDER — LEVOTHYROXINE SODIUM 125 UG/1
TABLET ORAL
Qty: 90 TABLET | Refills: 0 | Status: SHIPPED | OUTPATIENT
Start: 2022-06-16 | End: 2022-07-27

## 2022-06-16 NOTE — TELEPHONE ENCOUNTER
Prescription approved per [facility] Refill Protocol.  Fara Lucas, RN  [facility] Triage Nurse

## 2022-07-26 ENCOUNTER — OFFICE VISIT (OUTPATIENT)
Dept: INTERNAL MEDICINE | Facility: CLINIC | Age: 62
End: 2022-07-26
Payer: COMMERCIAL

## 2022-07-26 VITALS
WEIGHT: 246 LBS | OXYGEN SATURATION: 97 % | BODY MASS INDEX: 43.59 KG/M2 | SYSTOLIC BLOOD PRESSURE: 122 MMHG | HEIGHT: 63 IN | DIASTOLIC BLOOD PRESSURE: 78 MMHG | TEMPERATURE: 98 F | HEART RATE: 86 BPM

## 2022-07-26 DIAGNOSIS — E78.5 HYPERLIPIDEMIA LDL GOAL <100: ICD-10-CM

## 2022-07-26 DIAGNOSIS — Z12.31 VISIT FOR SCREENING MAMMOGRAM: ICD-10-CM

## 2022-07-26 DIAGNOSIS — E21.3 HYPERPARATHYROIDISM (H): Primary | ICD-10-CM

## 2022-07-26 DIAGNOSIS — E66.01 MORBID OBESITY, UNSPECIFIED OBESITY TYPE (H): ICD-10-CM

## 2022-07-26 DIAGNOSIS — R73.9 BLOOD GLUCOSE ELEVATED: ICD-10-CM

## 2022-07-26 DIAGNOSIS — Z12.11 SCREEN FOR COLON CANCER: ICD-10-CM

## 2022-07-26 DIAGNOSIS — E03.9 HYPOTHYROIDISM, UNSPECIFIED TYPE: ICD-10-CM

## 2022-07-26 PROCEDURE — 99214 OFFICE O/P EST MOD 30 MIN: CPT | Performed by: INTERNAL MEDICINE

## 2022-07-26 RX ORDER — CHOLECALCIFEROL (VITAMIN D3) 50 MCG
1 TABLET ORAL DAILY
Start: 2022-07-26

## 2022-07-26 ASSESSMENT — ANXIETY QUESTIONNAIRES
6. BECOMING EASILY ANNOYED OR IRRITABLE: NOT AT ALL
GAD7 TOTAL SCORE: 0
IF YOU CHECKED OFF ANY PROBLEMS ON THIS QUESTIONNAIRE, HOW DIFFICULT HAVE THESE PROBLEMS MADE IT FOR YOU TO DO YOUR WORK, TAKE CARE OF THINGS AT HOME, OR GET ALONG WITH OTHER PEOPLE: NOT DIFFICULT AT ALL
7. FEELING AFRAID AS IF SOMETHING AWFUL MIGHT HAPPEN: NOT AT ALL
5. BEING SO RESTLESS THAT IT IS HARD TO SIT STILL: NOT AT ALL
1. FEELING NERVOUS, ANXIOUS, OR ON EDGE: NOT AT ALL
GAD7 TOTAL SCORE: 0
3. WORRYING TOO MUCH ABOUT DIFFERENT THINGS: NOT AT ALL
2. NOT BEING ABLE TO STOP OR CONTROL WORRYING: NOT AT ALL

## 2022-07-26 ASSESSMENT — PATIENT HEALTH QUESTIONNAIRE - PHQ9: 5. POOR APPETITE OR OVEREATING: NOT AT ALL

## 2022-07-26 NOTE — PROGRESS NOTES
"  ASSESSMENT:   1. Hyperparathyroidism (H)  PTH worsened.  Normal calcium.  Low normal vitamin D.  We will add vitamin D and recheck lab 3 months  - Vitamin D Deficiency; Future  - Parathyroid Hormone Intact; Future  - Basic metabolic panel; Future  - vitamin D3 (CHOLECALCIFEROL) 50 mcg (2000 units) tablet; Take 1 tablet (50 mcg) by mouth daily    2. Morbid obesity, unspecified obesity type (H)  Weight down 8 pounds.  Encourage continued diet and exercise.  If fasting blood sugar remains elevated in future, will add metformin     3. Hypothyroidism, unspecified type  Thyroid function high normal.  Will continue current levothyroxine dose given need for weight loss but recheck in 6 months.  Patient to contact clinic earlier if future symptoms of jitteriness, loose stools or other signs of hyperthyroidism with further weight loss  - TSH with free T4 reflex; Future  - levothyroxine (SYNTHROID/LEVOTHROID) 125 MCG tablet; TAKE 1 TABLET BY MOUTH EVERY DAY IN THE MORNING  Dispense: 90 tablet; Refill: 3    4. Hyperlipidemia LDL goal <100  10-year CAD risk not to the point of requiring statin therapy.  Continue working on diet and exercise and recheck lipids 6 months  - Lipid panel reflex to direct LDL Fasting; Future    5. Blood glucose elevated  Mild glucose elevation.  Weight improved.  Defer metformin for now and recheck lab 3 months  - Hemoglobin A1c; Future  - Glucose; Future    6. Screen for colon cancer  Patient declines colon cancer screening at this time.  Encouraged at least to get FIT stool kit through lab and inform physician in future if willing to undergo colonoscopy screening    7. Visit for screening mammogram  Encourage patient to schedule mammogram appointment.  Mammogram previously ordered          PLAN:  Please start Vitamin D3 (2,000 IU tablet), 1 tablet daily. You can purchase this over the counter at any pharmacy.  Call our appointment desk at [phone] or use [employer] to schedule a \"lab only\" to " have your  PTH, Basic Metabolic Panel and Vitamin D (25 hydroxy) checked non-fasting in 3 months and your A1C, Blood Sugar (Glucose), Lipid profile (Cholesterol Panel) and TSH checked fasting in 6 months.   Continue current medications  Mammogram     This will be done at the [facility]. Call [phone] or use [employer] to schedule.    I would recommend a covid booster vaccination. You may have it done at any pharmacy. If you wish to have it done at a [place] pharmacy, then go to [email or web address] to schedule a vaccination appointment  Reduce calorie/carbohydrate (sugar, bread, potato, pasta, rice, alcohol etc)  intake in diet.  Increase color on your plate with fruits and vegetables. Increase  frequency of walking or other aerobic exercise as able (goal is daily)   FIT stool test in lab for colon cancer screening.  Inform MD in the future when going to undergo a colonoscopy for screening        (Chart documentation was completed, in part, with [employer] voice-recognition software. Even though reviewed, some grammatical, spelling, and word errors may remain.)    Maico Bell MD  Internal Medicine Department  [facility]      Michelle Washington is a 62 year old, presenting for the following health issues:  Lab Result Notice      History of Present Illness       Hypothyroidism:     Since last visit, patient describes the following symptoms::  NoneShe exercises with enough effort to increase her heart rate 10 to 19 minutes per day.  She exercises with enough effort to increase her heart rate 3 or less days per week.   She is taking medications regularly.        Most recent lab results reviewed with pt.      Component      Latest Ref Rng & Units 3/26/2021 5/3/2021 4/6/2022   Sodium      133 - 144 mmol/L 139 138 139   Potassium      3.4 - 5.3 mmol/L 4.0 4.4 3.8   Chloride      94 - 109 mmol/L 110 (H) 109 109   Carbon Dioxide      20 - 32 mmol/L 25 27 20   Anion  "Gap      3 - 14 mmol/L 4 2 (L) 10   Urea Nitrogen      7 - 30 mg/dL 9 12 12   Creatinine      0.52 - 1.04 mg/dL 0.95 1.00 0.90   Calcium      8.5 - 10.1 mg/dL 9.9 9.7 9.9   Glucose      70 - 99 mg/dL 88 93 105 (H)   Alkaline Phosphatase      40 - 150 U/L   69   AST      0 - 45 U/L   16   ALT      0 - 50 U/L   25   Protein Total      6.8 - 8.8 g/dL   7.4   Albumin      3.4 - 5.0 g/dL   3.8   Bilirubin Total      0.2 - 1.3 mg/dL   0.4   GFR Estimate      >60 mL/min/1.73m2 64 60 (L) 71   GFR Estimate If Black      >60 mL/min/1.73:m2 74 69    Cholesterol      <200 mg/dL   205 (H)   Triglycerides      <150 mg/dL   174 (H)   HDL Cholesterol      >=50 mg/dL   51   LDL Cholesterol Calculated      <=100 mg/dL   119 (H)   Non HDL Cholesterol      <130 mg/dL   154 (H)   Patient Fasting > 8hrs?         Yes   Parathyroid Hormone Intact      18 - 80 pg/mL 122 (H) 114 (H) 143 (H)   Vitamin D Deficiency screening      20 - 75 ug/L  31 30   TSH      0.40 - 4.00 mU/L   0.66       Denies chest pain, shortness of breath, abdominal pain, headache, vision changes or side effects with medications.  Mood good with the use of Lexapro. GAD7 = 0.  Weight down 8 pounds with improved diet and exercise  Additional ROS:   Constitutional, HEENT, Cardiovascular, Pulmonary, GI and , Neuro, MSK and Psych review of systems/symptoms are otherwise negative or unchanged from previous, except as noted above.        No identified additional risks  The 10-year ASCVD risk score ([place] SANTANA Jr., et al., 2013) is: 6.4%    Values used to calculate the score:      Age: 62 years      Sex: Female      Is Non- : No      Diabetic: No      Tobacco smoker: No      Systolic Blood Pressure: 122 mmHg      Is BP treated: Yes      HDL Cholesterol: 51 mg/dL      Total Cholesterol: 205 mg/dL    OBJECTIVE:  /78   Pulse 86   Temp 98  F (36.7  C) (Oral)   Ht 1.6 m (5' 3\")   Wt 111.6 kg (246 lb)   LMP 12/23/2004   SpO2 97%   BMI 43.58 kg/m   " "  Estimated body mass index is 43.58 kg/m  as calculated from the following:    Height as of this encounter: 1.6 m (5' 3\").    Weight as of this encounter: 111.6 kg (246 lb).     Neck: no adenopathy. Thyroid normal to palpation. No bruits  Pulm: Lungs clear to auscultation   CV: Regular rates and rhythm  GI: Soft, obese, nontender, Normal active bowel sounds, No hepatosplenomegaly or masses palpable  Ext: Peripheral pulses intact. No edema.  Neuro: Normal strength and tone, sensory exam grossly normal                    .  ..  "

## 2022-07-26 NOTE — PATIENT INSTRUCTIONS
"Please start Vitamin D3 (2,000 IU tablet), 1 tablet daily. You can purchase this over the counter at any pharmacy.  Call our appointment desk at [phone] or use [employer] to schedule a \"lab only\" to have your  PTH, Basic Metabolic Panel and Vitamin D (25 hydroxy) checked non-fasting in 3 months and your A1C, Blood Sugar (Glucose), Lipid profile (Cholesterol Panel) and TSH checked fasting in 6 months.   Continue current medications  Mammogram     This will be done at the [facility]. Call [phone] or use [employer] to schedule.    I would recommend a covid booster vaccination. You may have it done at any pharmacy. If you wish to have it done at a [place] pharmacy, then go to [email or web address] to schedule a vaccination appointment  Reduce calorie/carbohydrate (sugar, bread, potato, pasta, rice, alcohol etc)  intake in diet.  Increase color on your plate with fruits and vegetables. Increase  frequency of walking or other aerobic exercise as able (goal is daily).   FIT stool test in lab for colon cancer screening.  Inform MD in the future when going to undergo a colonoscopy for screening  "

## 2022-07-27 RX ORDER — LEVOTHYROXINE SODIUM 125 UG/1
TABLET ORAL
Qty: 90 TABLET | Refills: 3 | Status: SHIPPED | OUTPATIENT
Start: 2022-07-27 | End: 2023-09-24

## 2022-08-08 DIAGNOSIS — F41.1 ANXIETY STATE: ICD-10-CM

## 2022-08-10 RX ORDER — ESCITALOPRAM OXALATE 20 MG/1
TABLET ORAL
Qty: 90 TABLET | Refills: 2 | Status: SHIPPED | OUTPATIENT
Start: 2022-08-10 | End: 2023-03-28

## 2022-08-28 DIAGNOSIS — I10 HYPERTENSION, UNSPECIFIED TYPE: ICD-10-CM

## 2022-08-28 RX ORDER — AMLODIPINE AND BENAZEPRIL HYDROCHLORIDE 5; 40 MG/1; MG/1
CAPSULE ORAL
Qty: 90 CAPSULE | Refills: 3 | Status: SHIPPED | OUTPATIENT
Start: 2022-08-28 | End: 2023-09-24

## 2022-10-09 ENCOUNTER — HEALTH MAINTENANCE LETTER (OUTPATIENT)
Age: 62
End: 2022-10-09

## 2023-03-25 ENCOUNTER — HEALTH MAINTENANCE LETTER (OUTPATIENT)
Age: 63
End: 2023-03-25

## 2023-04-01 NOTE — TELEPHONE ENCOUNTER
"Requested Prescriptions   Pending Prescriptions Disp Refills     levothyroxine (SYNTHROID/LEVOTHROID) 125 MCG tablet [Pharmacy Med Name: LEVOTHYROXINE 125 MCG TABLET] 45 tablet 3     Sig: TAKE 1 TABLET BY MOUTH EVERY OTHER DAY (EVEN DAYS)  Last Written Prescription Date:  08/09/2018  Last Fill Quantity: 45 tablet,  # refills: 3   Last Office Visit: 8/9/2018 [facility]  Future Office Visit:            Thyroid Protocol Passed - 7/20/2019  8:31 AM        Passed - Patient is 12 years or older        Passed - Recent (12 mo) or future (30 days) visit within the authorizing provider's specialty     Patient had office visit in the last 12 months or has a visit in the next 30 days with authorizing provider or within the authorizing provider's specialty.  See \"Patient Info\" tab in inbasket, or \"Choose Columns\" in Meds & Orders section of the refill encounter.              Passed - Medication is active on med list        Passed - Normal TSH on file in past 12 months     Recent Labs   Lab Test 08/03/18  0929   TSH 3.15              Passed - No active pregnancy on record     If patient is pregnant or has had a positive pregnancy test, please check TSH.          Passed - No positive pregnancy test in past 12 months     If patient is pregnant or has had a positive pregnancy test, please check TSH.          " Kraig Guzman

## 2023-07-07 ENCOUNTER — LAB (OUTPATIENT)
Dept: LAB | Facility: CLINIC | Age: 63
End: 2023-07-07
Payer: COMMERCIAL

## 2023-07-07 DIAGNOSIS — E03.9 HYPOTHYROIDISM, UNSPECIFIED TYPE: ICD-10-CM

## 2023-07-07 DIAGNOSIS — R73.9 BLOOD GLUCOSE ELEVATED: ICD-10-CM

## 2023-07-07 DIAGNOSIS — Z12.11 SPECIAL SCREENING FOR MALIGNANT NEOPLASMS, COLON: ICD-10-CM

## 2023-07-07 DIAGNOSIS — E78.5 HYPERLIPIDEMIA LDL GOAL <100: ICD-10-CM

## 2023-07-07 DIAGNOSIS — E21.3 HYPERPARATHYROIDISM (H): ICD-10-CM

## 2023-07-07 LAB
ANION GAP SERPL CALCULATED.3IONS-SCNC: 10 MMOL/L (ref 7–15)
BUN SERPL-MCNC: 14.9 MG/DL (ref 8–23)
CALCIUM SERPL-MCNC: 10.2 MG/DL (ref 8.8–10.2)
CHLORIDE SERPL-SCNC: 106 MMOL/L (ref 98–107)
CHOLEST SERPL-MCNC: 215 MG/DL
CREAT SERPL-MCNC: 0.94 MG/DL (ref 0.51–0.95)
DEPRECATED CALCIDIOL+CALCIFEROL SERPL-MC: 43 UG/L (ref 20–75)
DEPRECATED HCO3 PLAS-SCNC: 23 MMOL/L (ref 22–29)
FASTING STATUS PATIENT QL REPORTED: YES
GFR SERPL CREATININE-BSD FRML MDRD: 67 ML/MIN/1.73M2
GLUCOSE SERPL-MCNC: 98 MG/DL (ref 70–99)
GLUCOSE SERPL-MCNC: 98 MG/DL (ref 70–99)
HBA1C MFR BLD: 5.6 % (ref 0–5.6)
HDLC SERPL-MCNC: 53 MG/DL
LDLC SERPL CALC-MCNC: 131 MG/DL
NONHDLC SERPL-MCNC: 162 MG/DL
POTASSIUM SERPL-SCNC: 4.3 MMOL/L (ref 3.4–5.3)
PTH-INTACT SERPL-MCNC: 99 PG/ML (ref 15–65)
SODIUM SERPL-SCNC: 139 MMOL/L (ref 136–145)
TRIGL SERPL-MCNC: 153 MG/DL
TSH SERPL DL<=0.005 MIU/L-ACNC: 0.52 UIU/ML (ref 0.3–4.2)

## 2023-07-07 PROCEDURE — 83036 HEMOGLOBIN GLYCOSYLATED A1C: CPT

## 2023-07-07 PROCEDURE — 36415 COLL VENOUS BLD VENIPUNCTURE: CPT

## 2023-07-07 PROCEDURE — 83970 ASSAY OF PARATHORMONE: CPT

## 2023-07-07 PROCEDURE — 84443 ASSAY THYROID STIM HORMONE: CPT

## 2023-07-07 PROCEDURE — 80048 BASIC METABOLIC PNL TOTAL CA: CPT

## 2023-07-07 PROCEDURE — 82306 VITAMIN D 25 HYDROXY: CPT

## 2023-07-07 PROCEDURE — 80061 LIPID PANEL: CPT

## 2023-07-24 DIAGNOSIS — F41.1 ANXIETY STATE: ICD-10-CM

## 2023-07-24 RX ORDER — ESCITALOPRAM OXALATE 20 MG/1
TABLET ORAL
Qty: 90 TABLET | Refills: 0 | Status: SHIPPED | OUTPATIENT
Start: 2023-07-24 | End: 2023-10-19

## 2023-09-24 DIAGNOSIS — I10 HYPERTENSION, UNSPECIFIED TYPE: ICD-10-CM

## 2023-09-24 DIAGNOSIS — E03.9 HYPOTHYROIDISM, UNSPECIFIED TYPE: ICD-10-CM

## 2023-09-24 RX ORDER — LEVOTHYROXINE SODIUM 125 UG/1
TABLET ORAL
Qty: 90 TABLET | Refills: 0 | Status: SHIPPED | OUTPATIENT
Start: 2023-09-24 | End: 2023-11-02

## 2023-09-24 RX ORDER — AMLODIPINE AND BENAZEPRIL HYDROCHLORIDE 5; 40 MG/1; MG/1
CAPSULE ORAL
Qty: 90 CAPSULE | Refills: 0 | Status: SHIPPED | OUTPATIENT
Start: 2023-09-24 | End: 2023-11-02

## 2023-09-24 NOTE — TELEPHONE ENCOUNTER
Patient does not appear to be using MyChart consistently as last MyChart message from May 2022 was read 5 months later.  Patient last seen July 2022.  Medication refills approved for 90 days.  Call patient and assist in scheduling a follow-up appointment with me in clinic sometime in early/mid December for follow-up of medical issues.   Further medication refills will be addressed as appropriate at that time

## 2023-09-26 NOTE — TELEPHONE ENCOUNTER
Left message for pt to call back. When pt calls back please make follow up medication check with PCP for Mid December.

## 2023-09-26 NOTE — TELEPHONE ENCOUNTER
Patient called back. States she needs to go to an appointment now and will call clinic tomorrow to schedule for December appointment with PCP.     Neetu Collier RN on 9/26/2023 at 4:19 PM

## 2023-10-19 DIAGNOSIS — F41.1 ANXIETY STATE: ICD-10-CM

## 2023-10-19 RX ORDER — ESCITALOPRAM OXALATE 20 MG/1
TABLET ORAL
Qty: 90 TABLET | Refills: 0 | Status: SHIPPED | OUTPATIENT
Start: 2023-10-19 | End: 2023-11-02

## 2023-10-20 NOTE — TELEPHONE ENCOUNTER
Last seen July 2022. NOT reading [facility] messages on time (read message from May 2022 in Oct 2023). Therefore call pt on the phone and assist with scheduling appt with me in next 6 weeks in open [facility] appt slot for follow-up  anxiety, hyperlipidemia, hypertension, hypothyroidism and medicare Wellness

## 2023-10-20 NOTE — TELEPHONE ENCOUNTER
Left message for pt to return clinic call. When pt calls back please schedule pt for medicare wellness with other issues to discuss in 6 weeks in a virtual spot.

## 2023-11-01 ASSESSMENT — PATIENT HEALTH QUESTIONNAIRE - PHQ9
10. IF YOU CHECKED OFF ANY PROBLEMS, HOW DIFFICULT HAVE THESE PROBLEMS MADE IT FOR YOU TO DO YOUR WORK, TAKE CARE OF THINGS AT HOME, OR GET ALONG WITH OTHER PEOPLE: NOT DIFFICULT AT ALL
SUM OF ALL RESPONSES TO PHQ QUESTIONS 1-9: 2
SUM OF ALL RESPONSES TO PHQ QUESTIONS 1-9: 2

## 2023-11-02 ENCOUNTER — VIRTUAL VISIT (OUTPATIENT)
Dept: INTERNAL MEDICINE | Facility: CLINIC | Age: 63
End: 2023-11-02
Payer: COMMERCIAL

## 2023-11-02 ENCOUNTER — LAB (OUTPATIENT)
Dept: INTERNAL MEDICINE | Facility: CLINIC | Age: 63
End: 2023-11-02

## 2023-11-02 DIAGNOSIS — E03.9 HYPOTHYROIDISM, UNSPECIFIED TYPE: ICD-10-CM

## 2023-11-02 DIAGNOSIS — M25.562 CHRONIC PAIN OF BOTH KNEES: ICD-10-CM

## 2023-11-02 DIAGNOSIS — Z12.31 VISIT FOR SCREENING MAMMOGRAM: ICD-10-CM

## 2023-11-02 DIAGNOSIS — Z12.11 SCREEN FOR COLON CANCER: ICD-10-CM

## 2023-11-02 DIAGNOSIS — E78.5 HYPERLIPIDEMIA LDL GOAL <100: ICD-10-CM

## 2023-11-02 DIAGNOSIS — F41.1 ANXIETY STATE: ICD-10-CM

## 2023-11-02 DIAGNOSIS — I10 HYPERTENSION, UNSPECIFIED TYPE: ICD-10-CM

## 2023-11-02 DIAGNOSIS — E21.3 HYPERPARATHYROIDISM (H): ICD-10-CM

## 2023-11-02 DIAGNOSIS — M25.561 CHRONIC PAIN OF BOTH KNEES: ICD-10-CM

## 2023-11-02 DIAGNOSIS — G89.29 CHRONIC PAIN OF BOTH KNEES: ICD-10-CM

## 2023-11-02 PROCEDURE — 99214 OFFICE O/P EST MOD 30 MIN: CPT | Mod: VID | Performed by: INTERNAL MEDICINE

## 2023-11-02 RX ORDER — AMLODIPINE AND BENAZEPRIL HYDROCHLORIDE 5; 40 MG/1; MG/1
1 CAPSULE ORAL DAILY
Qty: 90 CAPSULE | Refills: 3 | Status: SHIPPED | OUTPATIENT
Start: 2023-11-02

## 2023-11-02 RX ORDER — ESCITALOPRAM OXALATE 20 MG/1
20 TABLET ORAL DAILY
Qty: 90 TABLET | Refills: 3 | Status: SHIPPED | OUTPATIENT
Start: 2023-11-02

## 2023-11-02 RX ORDER — LEVOTHYROXINE SODIUM 125 UG/1
TABLET ORAL
Qty: 90 TABLET | Refills: 1 | Status: SHIPPED | OUTPATIENT
Start: 2023-11-02 | End: 2024-01-19

## 2023-11-02 NOTE — PATIENT INSTRUCTIONS
Continue current medications  Prescriptions refilled.    Call  [phone] or use [employer] to schedule a future lab appointment  fasting in mid January 2024.   For fasting labs, please refrain from eating for 8 hours or more.   Drink 2 glasses of water before your lab appointment. It is fine to take your  oral medications on the morning of the lab test as usual  Mammogram     This will be done at the [facility]. Call [phone] or use [employer] to schedule.   Referral to  [facility] Orthopedics re: knee pains  A representative will call you within 2 business days to help you schedule your appointment, or you may contact the [facility] Representative at: [phone].   Cologuard for colon cancer screening. The kit will be mailed to your home. Collect stool sample and then return the kit in the packaged box via the UPS shipping label provided with the kit  Get a blood pressure recheck   in  next 1-2 weeks at the  [facility] pharmacy. You will need to go to [email or web address] to schedule the blood pressure check appointment.  Reduce calorie/carbohydrate (sugar, bread, potato, pasta, rice, alcohol etc)  intake in diet.  Increase color on your plate with vegetables. Increase  frequency of walking or other aerobic exercise as able with your knee symptoms   Would recommend Prevnar-20 vaccination for pneumonia risk reduction (one time vaccine). Get at a pharmacy.  Consider influenza/flu  and/or covid vaccinations. May get at a pharmacy

## 2023-11-02 NOTE — PROGRESS NOTES
Padmini is a 63 year old who is being evaluated via a billable video visit.      How would you like to obtain your AVS? MyChart  If the video visit is dropped, the invitation should be resent by: Text to cell phone: [phone]  Will anyone else be joining your video visit? No          ASSESSMENT:    1. Hypertension, unspecified type  Last documented blood pressure July 2022 was at goal.  Continue current medications for now.  We will get blood pressure recheck through pharmacy in next 1 to 2 weeks  - amLODIPine-benazepril (LOTREL) 5-40 MG capsule; Take 1 capsule by mouth daily  Dispense: 90 capsule; Refill: 3    2. Anxiety state  Controlled.  Continue current medication  - escitalopram (LEXAPRO) 20 MG tablet; Take 1 tablet (20 mg) by mouth daily  Dispense: 90 tablet; Refill: 3    3. Hypothyroidism, unspecified type  Controlled.  Continue current medication.  Repeat lab January 2024  - levothyroxine (SYNTHROID/LEVOTHROID) 125 MCG tablet; TAKE 1 TABLET BY MOUTH EVERY DAY IN THE MORNING  Dispense: 90 tablet; Refill: 1  - TSH with free T4 reflex; Future    4. Hyperparathyroidism (H24)  Elevated PTH level, improved with normal calcium and vitamin D.  Repeat future labs ordered January 2024  - Basic metabolic panel; Future  - Parathyroid Hormone Intact; Future    5. Hyperlipidemia LDL goal <100  Lipids elevated but not to the point of requiring statin therapy based on 10-year CAD risk.  Counseled regarding reduction in saturated fats.  Future labs ordered   - Lipid panel reflex to direct LDL Fasting; Future    6. Chronic pain of both knees  Chronic knee pain issues despite conservative treatment.  Affecting quality of life with ambulation.  No trauma.  Patient to see Ortho for further management  - Orthopedic  Referral; Future    7. Screen for colon cancer    Denies family history of colon cancer/polyps.  Reviewed the   reported sensitivity of the cologuard test and FIT test for colon cancer and large polyp   screening   compared to colonoscopy as below:  Colonoscopy detection of colon cancer = 96%, large polyp =  92%  Cologuard detection of colon cancer = 92%, large polyp =  42%  FIT stool test  detection of colon cancer = 74%, large polyp =  24%  Also discussed if the cologuard  or FIT stool test comes back positive, then a follow-up colonoscopy is required to evaluate things further and that colonoscopy will then be considered a diagnostic colonoscopy rather than a screening test and therefore may be a greater expense/charge to pt's insurance by the gastroenterologist  since it is not a screening test and instead being done because of a positive cologuard/FIT test result  in that case. Cologuard has a reported false positive rate of 13%.  Pt states understanding and wishes to proceed with cologuard testing  - COLOGUARD([employer]); Future    8. Visit for screening mammogram  Asymptomatic.  Due for mammogram as previously ordered      PLAN:  Continue current medications  Prescriptions refilled.    Call  [phone] or use [employer] to schedule a future lab appointment  fasting in mid January 2024.   For fasting labs, please refrain from eating for 8 hours or more.   Drink 2 glasses of water before your lab appointment. It is fine to take your  oral medications on the morning of the lab test as usual  Mammogram     This will be done at the [facility]. Call [phone] or use [employer] to schedule.   Referral to  [facility] Orthopedics re: knee pains  A representative will call you within 2 business days to help you schedule your appointment, or you may contact the  Representative at: [phone].   Cologuard for colon cancer screening. The kit will be mailed to your home. Collect stool sample and then return the kit in the packaged box via the UPS shipping label provided with the kit  Get a blood pressure recheck   in  next 1-2 weeks at the  [facility] pharmacy. You will need to  go to [email or web address] to schedule the blood pressure check appointment.  Reduce calorie/carbohydrate (sugar, bread, potato, pasta, rice, alcohol etc)  intake in diet.  Increase color on your plate with vegetables. Increase  frequency of walking or other aerobic exercise as able with your knee symptoms   Would recommend Prevnar-20 vaccination for pneumonia risk reduction (one time vaccine). Get at a pharmacy.  Consider influenza/flu  and/or covid vaccinations. May get at a pharmacy      Video-Visit Details    Type of service:  Video Visit    Video Start Time: 5:03pm    Video End Time:5:21pm    Originating Location (pt. Location): Home    Distant Location (provider location):  [facility]     Platform used for Video Visit: Dorina Bell MD  Internal Medicine Department  [facility]  Internal Medicine Department      (Chart documentation was completed, in part, with [employer] voice-recognition software. Even though reviewed, some grammatical, spelling, and word errors may remain.)         Michelle Washington is a 63 year old, presenting for the following health issues:  No chief complaint on file.      History of Present Illness       Hypothyroidism:     Since last visit, patient describes the following symptoms::  None    She eats 0-1 servings of fruits and vegetables daily.She consumes 0 sweetened beverage(s) daily.She exercises with enough effort to increase her heart rate 9 or less minutes per day.  She exercises with enough effort to increase her heart rate 3 or less days per week.   She is taking medications regularly.       Hypertension Follow-up    Do you check your blood pressure regularly outside of the clinic? No   Are you following a low salt diet? No  Are your blood pressures ever more than 140 on the top number (systolic) OR more   than 90 on the bottom number (diastolic), for example 140/90? No      Most recent lab results  reviewed with pt.         Component      Latest Ref Rng 4/6/2022  10:49 AM 4/6/2022  10:50 AM 7/7/2023  11:10 AM   Sodium      136 - 145 mmol/L 139   139    Potassium      3.4 - 5.3 mmol/L 3.8      Chloride      94 - 109 mmol/L 109      Carbon Dioxide      20 - 32 mmol/L 20      Anion Gap      7 - 15 mmol/L 10   10    Urea Nitrogen      7 - 30 mg/dL 12      Creatinine      0.51 - 0.95 mg/dL 0.90   0.94    Calcium      8.8 - 10.2 mg/dL 9.9   10.2    Glucose      70 - 99 mg/dL 105 (H)      Alkaline Phosphatase      40 - 150 U/L 69      AST      0 - 45 U/L 16      ALT      0 - 50 U/L 25      Protein Total      6.8 - 8.8 g/dL 7.4      Albumin      3.4 - 5.0 g/dL 3.8      Bilirubin Total      0.2 - 1.3 mg/dL 0.4      GFR Estimate      >60 mL/min/1.73m2 71   67    Potassium      3.4 - 5.3 mmol/L   4.3    Chloride      98 - 107 mmol/L   106    Carbon Dioxide (CO2)      22 - 29 mmol/L   23    Urea Nitrogen      8.0 - 23.0 mg/dL   14.9    Glucose      70 - 99 mg/dL   98    Glucose      70 - 99 mg/dL   98    Cholesterol      <200 mg/dL 205 (H)   215 (H)    Triglycerides      <150 mg/dL 174 (H)   153 (H)    HDL Cholesterol      >=50 mg/dL 51   53    LDL Cholesterol Calculated      <=100 mg/dL 119 (H)   131 (H)    Non HDL Cholesterol      <130 mg/dL 154 (H)   162 (H)    Patient Fasting? Yes   Yes    Parathyroid Hormone Intact      15 - 65 pg/mL  143 (H)  99 (H)    Vitamin D Deficiency screening      20 - 75 ug/L 30   43    TSH      0.40 - 4.00 mU/L 0.66      TSH      0.30 - 4.20 uIU/mL   0.52    Hemoglobin A1C      0.0 - 5.6 %   5.6        Mood improved. On Escitalopram. PHQ9 = 2. Prior GAD7 = 0   PTH improved. Calcium, Vit D normal  Denies chest pain, shortness of breath, abdominal pain, headache, vision changes or side effects with medications.  Due for colon cancer screen.  Declines colonoscopy.  Also due for breast cancer screening.  Denies breast pain.  Bilateral knee pain.  No swelling.  No trauma.  Denies redness of the  joints    No identified additional risks  The 10-year ASCVD risk score ([place] MUJICA, et al., 2019) is: 6.8%    Values used to calculate the score:      Age: 63 years      Sex: Female      Is Non- : No      Diabetic: No      Tobacco smoker: No      Systolic Blood Pressure: 119 mmHg      Is BP treated: Yes      HDL Cholesterol: 53 mg/dL      Total Cholesterol: 215 mg/dL           Additional ROS:   Constitutional, HEENT, Cardiovascular, Pulmonary, GI and , Neuro, MSK and Psych review of systems/symptoms are otherwise negative or unchanged from previous, except as noted above.           Objective :  No vitals obtained today    Physical Exam:  GENERAL:  alert and no distress  EYES: Eyes grossly normal to inspection, conjunctivae and sclerae normal  RESP: no audible wheeze, cough, or visible cyanosis.  No visible retractions or increased work of breathing.  Able to speak fully in complete sentences.  NEURO: Cranial nerves grossly intact, mentation intact and speech normal  PSYCH: mentation appears normal, affect normal/bright, judgement and insight intact, normal speech and appearance well-groomed

## 2023-11-14 ENCOUNTER — ALLIED HEALTH/NURSE VISIT (OUTPATIENT)
Dept: INTERNAL MEDICINE | Facility: CLINIC | Age: 63
End: 2023-11-14
Payer: COMMERCIAL

## 2023-11-14 VITALS — DIASTOLIC BLOOD PRESSURE: 79 MMHG | SYSTOLIC BLOOD PRESSURE: 119 MMHG

## 2023-11-14 DIAGNOSIS — Z01.30 BP CHECK: Primary | ICD-10-CM

## 2023-11-14 PROCEDURE — 99207 PR NO CHARGE NURSE ONLY: CPT | Performed by: INTERNAL MEDICINE

## 2023-11-14 NOTE — PROGRESS NOTES
Padmini Alan was evaluated at [facility] on November 14, 2023 at which time her blood pressure was:    BP Readings from Last 1 Encounters:   11/14/23 119/79     No data recorded      Reviewed lifestyle modifications for blood pressure control and reduction: including making healthy food choices, managing weight, getting regular exercise, smoking cessation, reducing alcohol consumption, monitoring blood pressure regularly.     Symptoms: None    BP Goal:< 140/90 mmHg    BP Assessment:  BP at goal    Potential Reasons for BP too high: NA - Not applicable    BP Follow-Up Plan: Recheck BP in 6 months at pharmacy    Recommendation to Provider: BP checked at pharmacy and noted to be at goal of <140/90.   Recommended patient follow-up in 6 months at the pharmacy.    Note completed by: Nai Reveles RPH

## 2023-11-24 NOTE — PROGRESS NOTES
CHIEF COMPLAINT:  No chief complaint on file.       HISTORY OF PRESENT ILLNESS  Ms. Alan is a pleasant 63 year old year old female who presents to clinic today with chronic bilateral knee pain with R>L and have hurt for years.  Padmini explains that she's just been putting off getting them looked at; she has had many falls. She has no pain at rest; pain when standing. She did retail for 22 years, so lots of standing/walking    Onset: gradual, unchanged  Location: bilateral knee jpain R>L  Quality:  unable to describe pain  Duration: 5 years   Severity: 9/10 at worst  Timing:intermittent episodes with activity  Modifying factors:  resting and non-use makes it better, movement and use makes it worse  Associated signs & symptoms: leg gives out; crepitus with motion  Previous similar pain: No  Treatments to date: Takes Advil daily    Additional history: as documented    Review of Systems:  Have you recently had a a fever, chills, weight loss? No  Do you have any vision problems? No  Do you have any chest pain or edema? No  Do you have any shortness of breath or wheezing?  No  Do you have stomach problems? No  Do you have any numbness or focal weakness? No  Do you have diabetes? No  Do you have problems with bleeding or clotting? No  Do you have an rashes or other skin lesions? No    MEDICAL HISTORY  Patient Active Problem List   Diagnosis    Anxiety state    Other diseases of nasal cavity and sinuses    ACP (advance care planning)    Hypothyroidism, unspecified type    Essential hypertension    Morbid obesity, unspecified obesity type (H)    Female stress incontinence    Hyperparathyroidism (H24)       Current Outpatient Medications   Medication Sig Dispense Refill    amLODIPine-benazepril (LOTREL) 5-40 MG capsule Take 1 capsule by mouth daily 90 capsule 3    escitalopram (LEXAPRO) 20 MG tablet Take 1 tablet (20 mg) by mouth daily 90 tablet 3    fluticasone (FLONASE) 50 MCG/ACT nasal spray Spray 1-2 sprays into both  nostrils daily 16 g 0    hydrocortisone (CORTIZONE 10) 1 % external lotion Apply topically 2 times daily 118 mL 1    levothyroxine (SYNTHROID/LEVOTHROID) 125 MCG tablet TAKE 1 TABLET BY MOUTH EVERY DAY IN THE MORNING 90 tablet 1    LORazepam (ATIVAN) 0.5 MG tablet 1 tab twice a day as needed for severe anxiety 20 tablet 0    vitamin D3 (CHOLECALCIFEROL) 50 mcg (2000 units) tablet Take 1 tablet (50 mcg) by mouth daily         Allergies   Allergen Reactions    No Known Drug Allergy        Family History   Problem Relation Age of Onset    Heart Disease Father         MI late 60s    Thyroid Disease Father     Cardiovascular Mother         palpitations       Additional medical/Social/Surgical histories reviewed in [facility] and updated as appropriate.       PHYSICAL EXAM  LMP 12/23/2004     General  - normal appearance, in no obvious distress  Musculoskeletal - bilateral knee  - stance: mildly antalgic gait  - inspection: trace effusion, mild quad atrophy  - palpation: medial joint line tenderness bilaterally  - ROM: 120 degrees flexion, 0 degrees extension, painful terminal flexion  - strength: 5/5 in flexion, 4/5 in extension   - special tests:  (-) varus at 0 and 30 degrees flexion  (-) valgus at 0 and 30 degrees flexion  Neuro  - no sensory or motor deficit, grossly normal coordination, normal muscle tone     IMAGING : XR knee bilateral 3 views. Final results and radiologist's interpretation, available in the [facility] health record. Images were reviewed with the patient/family members in the office today. My personal interpretation of the performed imaging is moderately severe medial compartment joint space narrowing bilaterally     ASSESSMENT & PLAN  Ms. Alan is a 63 year old year old female who presents to clinic today with chronic bilateral knee pain.    Radiographs revealing end stage DJD bilateral medial compartments.  Clinical examination also consistent with DJD.    Diagnosis: Primary osteoarthritis of bilateral  knee.    Pathophysiology and treatment options discussed for osteoarthritis of knee.  We reviewed active weight management and loss of 1 lb is perceived as 4# loss perceived by knee. Bracing options, injections and brief discussion about arthroplasty was also had.    We agreed to start with increasing activity level such as walking, corticosteroid injection bilaterally.  Discussed that ideally I would not like her to utilize ibuprofen daily and she should continue to wean from this as corticosteroid injections provide relief.    Follow up in 3-6 months.    PROCEDURE    Bilateral Knee Injection - Intraarticular  The patient was informed of the risks and the benefits of the procedure and a written consent was signed.  The patient s left knee was prepped with chlorhexidine in sterile fashion.   40 mg of triamcinolone suspension was drawn up into a 5 mL syringe with 4 mL of 1% lidocaine.  Injection was performed using substerile technique.  A 1.5-inch 22-gauge needle was used to enter the anterolateral aspect of the left knee.  Injection performed successfully without difficulty.  There were no complications. The patient tolerated the procedure well. There was negligible bleeding.   This procedure as above was repeated for the right knee.  The patient s right knee was prepped with chlorhexidine in sterile fashion. Injection was performed using substerile technique.  A 1.5-inch 22-gauge needle was used to enter the anterolateral aspect of the right knee.  Injection performed successfully without difficulty.  There were no complications. The patient tolerated the procedure well. There was negligible bleeding.   The patient was instructed to ice the knee upon leaving clinic and refrain from overuse over the next 3 days.   The patient was instructed to call or go to the emergency room with any unusual pain, swelling, redness, or if otherwise concerned.  A follow up appointment will be scheduled to evaluate response to the  injection, and to assess range of motion and pain.     34 minutes on date of the encounter performing chart review, history and examination, independent imaging review, documentation, and additional activities noted above explaining options.  In addition to encounter time, separate time of 12 minutes utilized for procedure.  See procedure note for details.      Large Joint Injection/Arthocentesis: bilateral knee    Date/Time: 11/29/2023 10:21 AM    Performed by: Eleazar Barney DO  Authorized by: Eleazar Barney DO    Indications:  Pain  Needle Size:  22 G  Guidance: landmark guided    Location:  Knee  Laterality:  Bilateral      Medications (Right):  40 mg triamcinolone 40 MG/ML; 4 mL lidocaine 1 %  Medications (Left):  40 mg triamcinolone 40 MG/ML; 4 mL lidocaine 1 %  Outcome:  Tolerated well, no immediate complications  Procedure discussed: discussed risks, benefits, and alternatives    Consent Given by:  Patient  Timeout: timeout called immediately prior to procedure    Prep: patient was prepped and draped in usual sterile fashion          It was a pleasure seeing Padmini today.    Eleazar Barney DO, CAM  Primary Care Sports Medicine

## 2023-11-27 LAB — NONINV COLON CA DNA+OCC BLD SCRN STL QL: NEGATIVE

## 2023-11-29 ENCOUNTER — OFFICE VISIT (OUTPATIENT)
Dept: ORTHOPEDICS | Facility: CLINIC | Age: 63
End: 2023-11-29
Attending: INTERNAL MEDICINE
Payer: COMMERCIAL

## 2023-11-29 ENCOUNTER — ANCILLARY PROCEDURE (OUTPATIENT)
Dept: GENERAL RADIOLOGY | Facility: CLINIC | Age: 63
End: 2023-11-29
Attending: FAMILY MEDICINE
Payer: COMMERCIAL

## 2023-11-29 DIAGNOSIS — M25.561 CHRONIC PAIN OF BOTH KNEES: ICD-10-CM

## 2023-11-29 DIAGNOSIS — M17.0 BILATERAL PRIMARY OSTEOARTHRITIS OF KNEE: Primary | ICD-10-CM

## 2023-11-29 DIAGNOSIS — M25.562 CHRONIC PAIN OF BOTH KNEES: ICD-10-CM

## 2023-11-29 DIAGNOSIS — G89.29 CHRONIC PAIN OF BOTH KNEES: ICD-10-CM

## 2023-11-29 PROCEDURE — 20610 DRAIN/INJ JOINT/BURSA W/O US: CPT | Mod: 50 | Performed by: FAMILY MEDICINE

## 2023-11-29 PROCEDURE — 99204 OFFICE O/P NEW MOD 45 MIN: CPT | Mod: 25 | Performed by: FAMILY MEDICINE

## 2023-11-29 PROCEDURE — 73562 X-RAY EXAM OF KNEE 3: CPT | Mod: TC | Performed by: RADIOLOGY

## 2023-11-29 RX ORDER — LIDOCAINE HYDROCHLORIDE 10 MG/ML
4 INJECTION, SOLUTION INFILTRATION; PERINEURAL
Status: SHIPPED | OUTPATIENT
Start: 2023-11-29

## 2023-11-29 RX ORDER — TRIAMCINOLONE ACETONIDE 40 MG/ML
40 INJECTION, SUSPENSION INTRA-ARTICULAR; INTRAMUSCULAR
Status: SHIPPED | OUTPATIENT
Start: 2023-11-29

## 2023-11-29 RX ADMIN — LIDOCAINE HYDROCHLORIDE 4 ML: 10 INJECTION, SOLUTION INFILTRATION; PERINEURAL at 10:21

## 2023-11-29 RX ADMIN — TRIAMCINOLONE ACETONIDE 40 MG: 40 INJECTION, SUSPENSION INTRA-ARTICULAR; INTRAMUSCULAR at 10:21

## 2023-11-29 NOTE — PATIENT INSTRUCTIONS
Thank you for choosing [facility]    DR [facility] CLIN[facility] LOCATIONS  [place] [address] [address] [facility], [address]th [place]   [place], [place], [address] [place] [address]   [phone]       APPOINTMENTS: [phone]    CARE QUESTIONS: [phone],    BILLING QUESTIONS: [phone]    FAX NUMBER: [phone]      Follow up: as needed    1. Chronic pain of both knees        Steroid Injection Information:    A corticosteroid injection was administered at your visit today.  The area of injection may be sore, slightly swollen for 1-2 days afterward.  Immediately after injection, you may have an area of numbness, which is caused by the local anesthetic, lidocaine (similar to novacaine) in the shot.  This medicine will wear off in about 4 hours.  In addition to the lidocaine, a steroid medication was injected, called triamcinolone acetate.  This medication is intended to provide long-acting antiinflammatory / pain relief.  It may take 2-5 days for this medication to provide noticeable relief.      After an injection, Dr. Barney recommends:    Protecting the area wearing a bandage or gauze pad for at least 24 hours.    Using ice; ice bag or frozen vegetables over the injection site every one to two hours when able (for 15 minutes at a time).      Avoid any strenuous activity even if the knee is already feeling better immediately afterward. Light stretching is encouraged but refrain from home exercise program and increasing activity level for about 48 hours.    Avoid soaking in a hot tub, bath, or pool for 48 hours after injection. Showering is fine!    Watch for signs of infection, including increasing pain, redness and swelling that last more than 48 hours after injection.

## 2023-11-29 NOTE — LETTER
11/29/2023         RE: Padmini Alan  [address] [facility] [address]        Dear Colleague,    Thank you for referring your patient, Padmini Alan, to the [facility] [place]. Please see a copy of my visit note below.    CHIEF COMPLAINT:  No chief complaint on file.       HISTORY OF PRESENT ILLNESS  Ms. Alan is a pleasant 63 year old year old female who presents to clinic today with chronic bilateral knee pain with R>L and have hurt for years.  Padmini explains that she's just been putting off getting them looked at; she has had many falls. She has no pain at rest; pain when standing. She did retail for 22 years, so lots of standing/walking    Onset: gradual, unchanged  Location: bilateral knee jpain R>L  Quality:  unable to describe pain  Duration: 5 years   Severity: 9/10 at worst  Timing:intermittent episodes with activity  Modifying factors:  resting and non-use makes it better, movement and use makes it worse  Associated signs & symptoms: leg gives out; crepitus with motion  Previous similar pain: No  Treatments to date: Takes Advil daily    Additional history: as documented    Review of Systems:  Have you recently had a a fever, chills, weight loss? No  Do you have any vision problems? No  Do you have any chest pain or edema? No  Do you have any shortness of breath or wheezing?  No  Do you have stomach problems? No  Do you have any numbness or focal weakness? No  Do you have diabetes? No  Do you have problems with bleeding or clotting? No  Do you have an rashes or other skin lesions? No    MEDICAL HISTORY  Patient Active Problem List   Diagnosis     Anxiety state     Other diseases of nasal cavity and sinuses     ACP (advance care planning)     Hypothyroidism, unspecified type     Essential hypertension     Morbid obesity, unspecified obesity type (H)     Female stress incontinence     Hyperparathyroidism (H24)       Current Outpatient Medications   Medication Sig  Dispense Refill     amLODIPine-benazepril (LOTREL) 5-40 MG capsule Take 1 capsule by mouth daily 90 capsule 3     escitalopram (LEXAPRO) 20 MG tablet Take 1 tablet (20 mg) by mouth daily 90 tablet 3     fluticasone (FLONASE) 50 MCG/ACT nasal spray Spray 1-2 sprays into both nostrils daily 16 g 0     hydrocortisone (CORTIZONE 10) 1 % external lotion Apply topically 2 times daily 118 mL 1     levothyroxine (SYNTHROID/LEVOTHROID) 125 MCG tablet TAKE 1 TABLET BY MOUTH EVERY DAY IN THE MORNING 90 tablet 1     LORazepam (ATIVAN) 0.5 MG tablet 1 tab twice a day as needed for severe anxiety 20 tablet 0     vitamin D3 (CHOLECALCIFEROL) 50 mcg (2000 units) tablet Take 1 tablet (50 mcg) by mouth daily         Allergies   Allergen Reactions     No Known Drug Allergy        Family History   Problem Relation Age of Onset     Heart Disease Father         MI late 60s     Thyroid Disease Father      Cardiovascular Mother         palpitations       Additional medical/Social/Surgical histories reviewed in [facility] and updated as appropriate.       PHYSICAL EXAM  LMP 12/23/2004     General  - normal appearance, in no obvious distress  Musculoskeletal - bilateral knee  - stance: mildly antalgic gait  - inspection: trace effusion, mild quad atrophy  - palpation: medial joint line tenderness bilaterally  - ROM: 120 degrees flexion, 0 degrees extension, painful terminal flexion  - strength: 5/5 in flexion, 4/5 in extension   - special tests:  (-) varus at 0 and 30 degrees flexion  (-) valgus at 0 and 30 degrees flexion  Neuro  - no sensory or motor deficit, grossly normal coordination, normal muscle tone     IMAGING : XR knee bilateral 3 views. Final results and radiologist's interpretation, available in the [facility] health record. Images were reviewed with the patient/family members in the office today. My personal interpretation of the performed imaging is moderately severe medial compartment joint space narrowing bilaterally     ASSESSMENT &  PLAN  Ms. Alan is a 63 year old year old female who presents to clinic today with chronic bilateral knee pain.    Radiographs revealing end stage DJD bilateral medial compartments.  Clinical examination also consistent with DJD.    Diagnosis: Primary osteoarthritis of bilateral knee.    Pathophysiology and treatment options discussed for osteoarthritis of knee.  We reviewed active weight management and loss of 1 lb is perceived as 4# loss perceived by knee. Bracing options, injections and brief discussion about arthroplasty was also had.    We agreed to start with increasing activity level such as walking, corticosteroid injection bilaterally.  Discussed that ideally I would not like her to utilize ibuprofen daily and she should continue to wean from this as corticosteroid injections provide relief.    Follow up in 3-6 months.    PROCEDURE    Bilateral Knee Injection - Intraarticular  The patient was informed of the risks and the benefits of the procedure and a written consent was signed.  The patient s left knee was prepped with chlorhexidine in sterile fashion.   40 mg of triamcinolone suspension was drawn up into a 5 mL syringe with 4 mL of 1% lidocaine.  Injection was performed using substerile technique.  A 1.5-inch 22-gauge needle was used to enter the anterolateral aspect of the left knee.  Injection performed successfully without difficulty.  There were no complications. The patient tolerated the procedure well. There was negligible bleeding.   This procedure as above was repeated for the right knee.  The patient s right knee was prepped with chlorhexidine in sterile fashion. Injection was performed using substerile technique.  A 1.5-inch 22-gauge needle was used to enter the anterolateral aspect of the right knee.  Injection performed successfully without difficulty.  There were no complications. The patient tolerated the procedure well. There was negligible bleeding.   The patient was instructed to ice  the knee upon leaving clinic and refrain from overuse over the next 3 days.   The patient was instructed to call or go to the emergency room with any unusual pain, swelling, redness, or if otherwise concerned.  A follow up appointment will be scheduled to evaluate response to the injection, and to assess range of motion and pain.     34 minutes on date of the encounter performing chart review, history and examination, independent imaging review, documentation, and additional activities noted above explaining options.  In addition to encounter time, separate time of 12 minutes utilized for procedure.  See procedure note for details.      Large Joint Injection/Arthocentesis: bilateral knee    Date/Time: 11/29/2023 10:21 AM    Performed by: Eleazar Barney DO  Authorized by: Eleazar Barney DO    Indications:  Pain  Needle Size:  22 G  Guidance: landmark guided    Location:  Knee  Laterality:  Bilateral      Medications (Right):  40 mg triamcinolone 40 MG/ML; 4 mL lidocaine 1 %  Medications (Left):  40 mg triamcinolone 40 MG/ML; 4 mL lidocaine 1 %  Outcome:  Tolerated well, no immediate complications  Procedure discussed: discussed risks, benefits, and alternatives    Consent Given by:  Patient  Timeout: timeout called immediately prior to procedure    Prep: patient was prepped and draped in usual sterile fashion          It was a pleasure seeing Padmini today.    Eleazar Barney DO, [facility]  Primary Care Sports Medicine      Again, thank you for allowing me to participate in the care of your patient.        Sincerely,        Eleazar Barney DO

## 2023-12-28 ENCOUNTER — LAB (OUTPATIENT)
Dept: LAB | Facility: CLINIC | Age: 63
End: 2023-12-28
Payer: COMMERCIAL

## 2023-12-28 DIAGNOSIS — E03.9 HYPOTHYROIDISM, UNSPECIFIED TYPE: ICD-10-CM

## 2023-12-28 DIAGNOSIS — E78.5 HYPERLIPIDEMIA LDL GOAL <100: ICD-10-CM

## 2023-12-28 DIAGNOSIS — E21.3 HYPERPARATHYROIDISM (H): ICD-10-CM

## 2023-12-28 LAB
ANION GAP SERPL CALCULATED.3IONS-SCNC: 11 MMOL/L (ref 7–15)
BUN SERPL-MCNC: 14.5 MG/DL (ref 8–23)
CALCIUM SERPL-MCNC: 10.2 MG/DL (ref 8.8–10.2)
CHLORIDE SERPL-SCNC: 104 MMOL/L (ref 98–107)
CHOLEST SERPL-MCNC: 233 MG/DL
CREAT SERPL-MCNC: 1.04 MG/DL (ref 0.51–0.95)
DEPRECATED HCO3 PLAS-SCNC: 24 MMOL/L (ref 22–29)
EGFRCR SERPLBLD CKD-EPI 2021: 59 ML/MIN/1.73M2
FASTING STATUS PATIENT QL REPORTED: YES
GLUCOSE SERPL-MCNC: 92 MG/DL (ref 70–99)
HDLC SERPL-MCNC: 68 MG/DL
LDLC SERPL CALC-MCNC: 138 MG/DL
NONHDLC SERPL-MCNC: 165 MG/DL
POTASSIUM SERPL-SCNC: 5 MMOL/L (ref 3.4–5.3)
PTH-INTACT SERPL-MCNC: 88 PG/ML (ref 15–65)
SODIUM SERPL-SCNC: 139 MMOL/L (ref 135–145)
TRIGL SERPL-MCNC: 134 MG/DL
TSH SERPL DL<=0.005 MIU/L-ACNC: 0.93 UIU/ML (ref 0.3–4.2)

## 2023-12-28 PROCEDURE — 80061 LIPID PANEL: CPT

## 2023-12-28 PROCEDURE — 80048 BASIC METABOLIC PNL TOTAL CA: CPT

## 2023-12-28 PROCEDURE — 83970 ASSAY OF PARATHORMONE: CPT

## 2023-12-28 PROCEDURE — 36415 COLL VENOUS BLD VENIPUNCTURE: CPT

## 2023-12-28 PROCEDURE — 84443 ASSAY THYROID STIM HORMONE: CPT

## 2024-01-08 ASSESSMENT — ENCOUNTER SYMPTOMS
CONSTIPATION: 0
NAUSEA: 0
SHORTNESS OF BREATH: 0
EYE PAIN: 0
SORE THROAT: 0
PALPITATIONS: 0
HEARTBURN: 0
DIARRHEA: 0
BREAST MASS: 0
HEADACHES: 0
PARESTHESIAS: 0
NERVOUS/ANXIOUS: 0
DIZZINESS: 0
HEMATOCHEZIA: 0
COUGH: 0
CHILLS: 0
JOINT SWELLING: 0
MYALGIAS: 0
WEAKNESS: 0
ARTHRALGIAS: 0
FEVER: 0
HEMATURIA: 0
DYSURIA: 0
ABDOMINAL PAIN: 0
FREQUENCY: 0

## 2024-01-08 ASSESSMENT — ACTIVITIES OF DAILY LIVING (ADL): CURRENT_FUNCTION: NO ASSISTANCE NEEDED

## 2024-01-15 ENCOUNTER — OFFICE VISIT (OUTPATIENT)
Dept: INTERNAL MEDICINE | Facility: CLINIC | Age: 64
End: 2024-01-15
Payer: COMMERCIAL

## 2024-01-15 VITALS
HEART RATE: 97 BPM | WEIGHT: 257.4 LBS | SYSTOLIC BLOOD PRESSURE: 135 MMHG | BODY MASS INDEX: 45.61 KG/M2 | HEIGHT: 63 IN | OXYGEN SATURATION: 97 % | DIASTOLIC BLOOD PRESSURE: 77 MMHG | RESPIRATION RATE: 20 BRPM

## 2024-01-15 DIAGNOSIS — Z12.31 VISIT FOR SCREENING MAMMOGRAM: ICD-10-CM

## 2024-01-15 DIAGNOSIS — Z00.01 ENCOUNTER FOR GENERAL ADULT MEDICAL EXAMINATION WITH ABNORMAL FINDINGS: ICD-10-CM

## 2024-01-15 DIAGNOSIS — Z23 NEED FOR PROPHYLACTIC VACCINATION AGAINST STREPTOCOCCUS PNEUMONIAE (PNEUMOCOCCUS): ICD-10-CM

## 2024-01-15 DIAGNOSIS — E21.3 HYPERPARATHYROIDISM (H): ICD-10-CM

## 2024-01-15 DIAGNOSIS — Z13.820 SCREENING FOR OSTEOPOROSIS: ICD-10-CM

## 2024-01-15 DIAGNOSIS — E03.9 HYPOTHYROIDISM, UNSPECIFIED TYPE: ICD-10-CM

## 2024-01-15 DIAGNOSIS — I10 ESSENTIAL HYPERTENSION: ICD-10-CM

## 2024-01-15 DIAGNOSIS — F41.1 ANXIETY STATE: ICD-10-CM

## 2024-01-15 DIAGNOSIS — Z00.01 ENCOUNTER FOR ROUTINE ADULT MEDICAL EXAM WITH ABNORMAL FINDINGS: Primary | ICD-10-CM

## 2024-01-15 DIAGNOSIS — E78.5 HYPERLIPIDEMIA LDL GOAL <100: ICD-10-CM

## 2024-01-15 DIAGNOSIS — E66.01 MORBID OBESITY, UNSPECIFIED OBESITY TYPE (H): ICD-10-CM

## 2024-01-15 PROCEDURE — 90471 IMMUNIZATION ADMIN: CPT | Performed by: INTERNAL MEDICINE

## 2024-01-15 PROCEDURE — 99397 PER PM REEVAL EST PAT 65+ YR: CPT | Mod: 25 | Performed by: INTERNAL MEDICINE

## 2024-01-15 PROCEDURE — 90677 PCV20 VACCINE IM: CPT | Performed by: INTERNAL MEDICINE

## 2024-01-15 PROCEDURE — 99214 OFFICE O/P EST MOD 30 MIN: CPT | Mod: 25 | Performed by: INTERNAL MEDICINE

## 2024-01-15 RX ORDER — LORAZEPAM 0.5 MG/1
TABLET ORAL
Qty: 15 TABLET | Refills: 0 | Status: SHIPPED | OUTPATIENT
Start: 2024-01-15

## 2024-01-15 ASSESSMENT — ENCOUNTER SYMPTOMS
NERVOUS/ANXIOUS: 0
HEMATOCHEZIA: 0
DIZZINESS: 0
HEARTBURN: 0
JOINT SWELLING: 0
COUGH: 0
DYSURIA: 0
PALPITATIONS: 0
HEMATURIA: 0
FREQUENCY: 0
HEADACHES: 0
ABDOMINAL PAIN: 0
FEVER: 0
SHORTNESS OF BREATH: 0
CHILLS: 0
DIARRHEA: 0
CONSTIPATION: 0
SORE THROAT: 0
PARESTHESIAS: 0
WEAKNESS: 0
NAUSEA: 0
EYE PAIN: 0
BREAST MASS: 0
MYALGIAS: 0
ARTHRALGIAS: 0

## 2024-01-15 ASSESSMENT — PATIENT HEALTH QUESTIONNAIRE - PHQ9
SUM OF ALL RESPONSES TO PHQ QUESTIONS 1-9: 0
10. IF YOU CHECKED OFF ANY PROBLEMS, HOW DIFFICULT HAVE THESE PROBLEMS MADE IT FOR YOU TO DO YOUR WORK, TAKE CARE OF THINGS AT HOME, OR GET ALONG WITH OTHER PEOPLE: NOT DIFFICULT AT ALL
SUM OF ALL RESPONSES TO PHQ QUESTIONS 1-9: 0

## 2024-01-15 ASSESSMENT — ACTIVITIES OF DAILY LIVING (ADL): CURRENT_FUNCTION: NO ASSISTANCE NEEDED

## 2024-01-15 NOTE — PROGRESS NOTES
" SUBJECTIVE:   Padmini is a 63 year old, presenting for the following:  Wellness Visit  And follow-up hypertension, obesity, hyperlipidemia, hypothyroidism, anxiety and other issues as below  Pt not under medicare for visits or meds per pt. Still using private insurance            1/15/2024     3:14 PM   Additional Questions   Roomed by Cally BECKMAN CMA   Accompanied by self       Are you in the first 12 months of your Medicare coverage?  No    Healthy Habits:     In general, how would you rate your overall health?  Good    Frequency of exercise:  1 day/week    Duration of exercise:  Less than 15 minutes    Do you usually eat at least 4 servings of fruit and vegetables a day, include whole grains    & fiber and avoid regularly eating high fat or \"junk\" foods?  Yes    Taking medications regularly:  Yes    Medication side effects:  None    Ability to successfully perform activities of daily living:  No assistance needed    Home Safety:  No safety concerns identified    Hearing Impairment:  No hearing concerns    In the past 6 months, have you been bothered by leaking of urine? Yes    In general, how would you rate your overall mental or emotional health?  Good    Additional concerns today:  No      Today's PHQ-9 Score:       1/15/2024     3:15 PM   PHQ-9 SCORE   PHQ-9 Total Score MyChart 0   PHQ-9 Total Score 0           Have you ever done Advance Care Planning? (For example, a Health Directive, POLST, or a discussion with a medical provider or your loved ones about your wishes): No, advance care planning information given to patient to review.  Patient declined advance care planning discussion at this time.       Fall risk  Fallen 2 or more times in the past year?: No  Any fall with injury in the past year?: No    Cognitive Screening    1) Repeat 3 items (Leader, Season, Table)      2) Clock draw: NORMAL  3) 3 item recall: Recalls 2 objects   Results: NORMAL clock, 1-2 items recalled: COGNITIVE IMPAIRMENT LESS " LIKELY    Mini-CogTM Copyright LAMONTE Hou. Licensed by the author for use in [facility]; reprinted with permission (mavis@[facility]). All rights reserved.      Do you have sleep apnea, excessive snoring or daytime drowsiness? : no    Reviewed and updated as needed this visit by clinical staff   Tobacco  Allergies  Meds              Reviewed and updated as needed this visit by Provider     Meds              Social History     Tobacco Use     Smoking status: Former     Types: Cigarettes     Quit date: 1987     Years since quittin.0     Smokeless tobacco: Never   Substance Use Topics     Alcohol use: Yes     Comment: socially         2024     9:23 AM   Alcohol Use   Prescreen: >3 drinks/day or >7 drinks/week? No     Do you have a current opioid prescription? No  Do you use any other controlled substances or medications that are not prescribed by a provider? None              Current providers sharing in care for this patient include:   Patient Care Team:  Maico Bell MD as PCP - General  Maico Bell MD as Assigned PCP  Eleazar Barney DO as Assigned Musculoskeletal Provider    The following health maintenance items are reviewed in Epic and correct as of today:  Health Maintenance   Topic Date Due     DEXA  Never done     DEPRESSION ACTION PLAN  Never done     MAMMO SCREENING  Never done     ZOSTER IMMUNIZATION (1 of 2) Never done     RSV VACCINE (Pregnancy & 60+) (1 - 1-dose 60+ series) Never done     ANNUAL REVIEW OF HM ORDERS  2023     INFLUENZA VACCINE (1) 2023     COVID-19 Vaccine (3 - - season) 2023     PHQ-9  07/15/2024     DTAP/TDAP/TD IMMUNIZATION (2 - Td or Tdap) 2024     TSH W/FREE T4 REFLEX  2024     MEDICARE ANNUAL WELLNESS VISIT  01/15/2025     FALL RISK ASSESSMENT  01/15/2025     COLORECTAL CANCER SCREENING  11/15/2026     LIPID  2028     ADVANCE CARE PLANNING  2029     HEPATITIS C SCREENING  Completed     Pneumococcal Vaccine:  65+ Years  Completed     HIV SCREENING  Addressed     IPV IMMUNIZATION  Aged Out     HPV IMMUNIZATION  Aged Out     MENINGITIS IMMUNIZATION  Aged Out     RSV MONOCLONAL ANTIBODY  Aged Out     PAP  Discontinued     Labs reviewed in EPIC    Component      Latest Ref Rng 4/6/2022  10:49 AM 4/6/2022  10:50 AM 7/7/2023  11:10 AM 12/28/2023  10:17 AM   Sodium      135 - 145 mmol/L 139   139  139    Potassium      3.4 - 5.3 mmol/L 3.8       Chloride      94 - 109 mmol/L 109       Carbon Dioxide      20 - 32 mmol/L 20       Anion Gap      7 - 15 mmol/L 10   10  11    Urea Nitrogen      7 - 30 mg/dL 12       Creatinine      0.51 - 0.95 mg/dL 0.90   0.94  1.04 (H)    Calcium      8.8 - 10.2 mg/dL 9.9   10.2  10.2    Glucose      70 - 99 mg/dL 105 (H)       Alkaline Phosphatase      40 - 150 U/L 69       AST      0 - 45 U/L 16       ALT      0 - 50 U/L 25       Protein Total      6.8 - 8.8 g/dL 7.4       Albumin      3.4 - 5.0 g/dL 3.8       Bilirubin Total      0.2 - 1.3 mg/dL 0.4       GFR Estimate      >60 mL/min/1.73m2 71   67  59 (L)    Potassium      3.4 - 5.3 mmol/L   4.3  5.0    Chloride      98 - 107 mmol/L   106  104    Carbon Dioxide (CO2)      22 - 29 mmol/L   23  24    Urea Nitrogen      8.0 - 23.0 mg/dL   14.9  14.5    Glucose      70 - 99 mg/dL   98  92    Glucose         98     Cholesterol      <200 mg/dL 205 (H)   215 (H)  233 (H)    Triglycerides      <150 mg/dL 174 (H)   153 (H)  134    HDL Cholesterol      >=50 mg/dL 51   53  68    LDL Cholesterol Calculated      <=100 mg/dL 119 (H)   131 (H)  138 (H)    Non HDL Cholesterol      <130 mg/dL 154 (H)   162 (H)  165 (H)    Patient Fasting? Yes   Yes  Yes    Parathyroid Hormone Intact      15 - 65 pg/mL  143 (H)  99 (H)  88 (H)    Vitamin D Deficiency screening      20 - 75 ug/L 30   43     TSH      0.40 - 4.00 mU/L 0.66       TSH      0.30 - 4.20 uIU/mL   0.52  0.93    Hemoglobin A1C      0.0 - 5.6 %   5.6        Legend:  (H) High  (L) Low        1/8/2024      9:24 AM   Breast CA Risk Assessment (FHS-7)   Do you have a family history of breast, colon, or ovarian cancer? No / Unknown         Mammogram Screening: Recommended mammography every 1-2 years with patient discussion and risk factor consideration  Pertinent mammograms are reviewed under the imaging tab.    Review of Systems   Constitutional:  Negative for chills and fever.   HENT:  Negative for congestion, ear pain, hearing loss and sore throat.    Eyes:  Negative for pain and visual disturbance.   Respiratory:  Negative for cough and shortness of breath.    Cardiovascular:  Negative for chest pain, palpitations and peripheral edema.   Gastrointestinal:  Negative for abdominal pain, constipation, diarrhea, heartburn, hematochezia and nausea.   Breasts:  Negative for tenderness, breast mass and discharge.   Genitourinary:  Negative for dysuria, frequency, genital sores, hematuria, pelvic pain, urgency, vaginal bleeding and vaginal discharge.   Musculoskeletal:  Negative for arthralgias, joint swelling and myalgias.   Skin:  Negative for rash.   Neurological:  Negative for dizziness, weakness, headaches and paresthesias.   Psychiatric/Behavioral:  Negative for mood changes. The patient is not nervous/anxious.       Using reading glasses. Due for eye exam   Weigh up 11 pounds in 1 year  Rare GERD with certain foods (Spicy, chocolate)   Had some bilateral knee pain and had cortisone injection couple months ago and still better. Rare Advil use. Still moving better  Mood good with meds. PHQ9 = 0.     No identified additional risks  The 10-year ASCVD risk score ([place] MUJICA, et al., 2019) is: 8.2%    Values used to calculate the score:      Age: 63 years      Sex: Female      Is Non- : No      Diabetic: No      Tobacco smoker: No      Systolic Blood Pressure: 135 mmHg      Is BP treated: Yes      HDL Cholesterol: 68 mg/dL      Total Cholesterol: 233 mg/dL        OBJECTIVE:   /77   Pulse 97    "Resp 20   Ht 1.6 m (5' 3\")   Wt 116.8 kg (257 lb 6.4 oz)   LMP 12/23/2004   SpO2 97%   BMI 45.60 kg/m   Estimated body mass index is 45.6 kg/m  as calculated from the following:    Height as of this encounter: 1.6 m (5' 3\").    Weight as of this encounter: 116.8 kg (257 lb 6.4 oz).  Physical Exam  General appearance -   alert, no distress  Skin - No rashes or lesions. Dry skin distal BLE  Head - normocephalic, atraumatic  Eyes - LORENE, EOMI, fundi exam with nondilated pupils negative.  Ears - External ears normal. Canals clear. TM's normal.  Nose/Sinuses - Nares normal. Septum midline. Mucosa normal. No drainage or sinus tenderness.  Oropharynx - No erythema, no adenopathy, no exudates.  Neck - Supple without adenopathy or thyromegaly. No bruits.  Lungs - Clear to auscultation without wheezes/rhonchi.  Heart - Regular rate and rhythm without murmurs, clicks, or gallops.  Nodes - No supraclavicular, axillary, or inguinal adenopathy palpable.  Breasts - deferred  Abdomen - Abdomen obese, soft, non-tender. BS normal. No masses or hepatosplenomegaly palpable. No bruits.  Extremities -No cyanosis, clubbing or edema.    Musculoskeletal - Spine ROM normal. Muscular strength intact.  Mild tenderness palpation bilateral knee joint line.  No effusions.  Ligament exams grossly intact  Peripheral pulses - radial=4/4, femoral=4/4, posterior tibial=4/4, dorsalis pedis=4/4,  Neuro - Gait normal. Reflexes normal and symmetric. Sensation grossly WNL.  Genital/Rectal - deferred      ASSESSMENT / PLAN:   1. Encounter for routine adult medical exam with abnormal findings  Patient not under Medicare.  Prevnar 20 vaccination today.  Due for tetanus vaccination this summer.  Patient declines COVID or influenza vaccination despite   MD recommendation.  Recommended RSV vaccine and will get through pharmacy.       Due for colon cancer screening again with Cologuard in 2026.  Due for eye examination    2. Anxiety state  Controlled.  " Continue escitalopram.   Using lorazepam just related to anxiety with flying. [place]  reviewed.  Timing of refill appropriate  - LORazepam (ATIVAN) 0.5 MG tablet; 1 tab twice a day as needed for severe anxiety and with flying  Dispense: 15 tablet; Refill: 0    3. Morbid obesity, unspecified obesity type (H)  Patient states she recently started [facility] program 2 weeks ago.  Weight up 11 pounds overall since last visit.  Contributing to hypertension knee pain issues, etc.  Encourage walking exercise as able.  Dietary changes program.  If not able to lose weight in the future, will speak with insurance regarding possible coverage for GLP-1 agonist     4. Hypothyroidism, unspecified type  Controlled.  Continue current medication.  Repeat lab 1 year  - TSH with free T4 reflex; Future  - levothyroxine (SYNTHROID/LEVOTHROID) 125 MCG tablet; TAKE 1 TABLET BY MOUTH EVERY DAY IN THE MORNING  Dispense: 90 tablet; Refill: 3    5. Hyperparathyroidism (H24)  Parathyroid level improved and calcium level normal.    - Parathyroid Hormone Intact; Future  - Basic metabolic panel; Future    6. Essential hypertension  Controlled.  Continue amlodipine/ramipril.  Repeat lab 1 year  - Comprehensive metabolic panel; Future    7. Visit for screening mammogram  Due for breast cancer screening.  Asymptomatic.  - MA SCREENING DIGITAL BILAT - Future  (s+30); Future    8. Need for prophylactic vaccination against Streptococcus pneumoniae (pneumococcus)  - PNEUMOCOCCAL 20 VALENT CONJUGATE (PREVNAR 20)    9. Screening for osteoporosis  Candidate for screening.  Menopausal  - DEXA HIP/PELVIS/SPINE - Future; Future    10. Hyperlipidemia LDL goal <100  Lipids minimally above risk factor cutoff for  where statin would be indicated.  Patient declines medication therapy at this time.  Will work on diet and exercise and repeat lipids again in 1 year  - Comprehensive metabolic panel; Future  - Lipid panel reflex to direct LDL Fasting;  "Future          Patient has been advised of split billing requirements and indicates understanding: Yes      COUNSELING:  Reviewed preventive health counseling, as reflected in patient instructions        She reports that she quit smoking about 37 years ago. Her smoking use included cigarettes. She has never used smokeless tobacco.      Appropriate preventive services were discussed with this patient, including applicable screening as appropriate for fall prevention, nutrition, physical activity, Tobacco-use cessation, weight loss and cognition.  Checklist reviewing preventive services available has been given to the patient.    Reviewed patients plan of care and provided an AVS. The Complex Care Plan (for patients with higher acuity and needing more deliberate coordination of services) for Padmini meets the Care Plan requirement. This Care Plan has been established and reviewed with the Patient.     PLAN:  Continue current medications.  Lorazepam refilled. Other meds should still have refills   Call  [phone] or use [employer] to schedule a future lab appointment  non-fasting in 6 months re\": calcium status and fasting in 1 year.   For fasting labs, please refrain from eating for 8 hours or more.   Drink 2 glasses of water before your lab appointment. It is fine to take your  oral medications on the morning of the lab test as usual  Reduce calorie/carbohydrate (sugar, bread, potato, pasta, rice, alcohol etc)  intake in diet.  Increase color on your plate with vegetables. Increase  frequency of walking or other aerobic exercise as able (goal is daily)  Vaccinations: Prevnar 20 vaccine for pneumonia prevention   I would recommend a covid booster  and influenza/flu and RSV vaccination. You may have it done at any pharmacy. If you wish to have it done at a [facility] pharmacy, then go to [email or web address] to schedule a vaccination appointment  Bone density (DEXA)   and Mammogram     This will be done at the " [facility]. Call [phone] or use [employer] to schedule.   Schedule an eye exam appointment. Please ask the eye clinic to fax us a report of your eye exam to [phone], attention Dr Bell  Pt was informed regarding extra E&M billing for management of new or established medical issues not related to today's wellness/screening visit    Maico Bell MD  [facility]        Identified Health Risks:  I have reviewed Opioid Use Disorder and Substance Use Disorder risk factors and  NA       She is at risk for lack of exercise and has been provided with information to increase physical activity for the benefit of her well-being.  Information on urinary incontinence and treatment options given to patient.

## 2024-01-19 RX ORDER — LEVOTHYROXINE SODIUM 125 UG/1
TABLET ORAL
Qty: 90 TABLET | Refills: 3 | Status: SHIPPED | OUTPATIENT
Start: 2024-01-19

## 2024-02-01 ENCOUNTER — OFFICE VISIT (OUTPATIENT)
Dept: ORTHOPEDICS | Facility: CLINIC | Age: 64
End: 2024-02-01
Payer: COMMERCIAL

## 2024-02-01 VITALS — BODY MASS INDEX: 45.54 KG/M2 | HEIGHT: 63 IN | WEIGHT: 257 LBS

## 2024-02-01 DIAGNOSIS — M17.0 BILATERAL PRIMARY OSTEOARTHRITIS OF KNEE: Primary | ICD-10-CM

## 2024-02-01 PROCEDURE — 20610 DRAIN/INJ JOINT/BURSA W/O US: CPT | Mod: 50 | Performed by: FAMILY MEDICINE

## 2024-02-01 RX ORDER — TRIAMCINOLONE ACETONIDE 40 MG/ML
40 INJECTION, SUSPENSION INTRA-ARTICULAR; INTRAMUSCULAR
Status: SHIPPED | OUTPATIENT
Start: 2024-02-01

## 2024-02-01 RX ORDER — LIDOCAINE HYDROCHLORIDE 10 MG/ML
4 INJECTION, SOLUTION INFILTRATION; PERINEURAL
Status: SHIPPED | OUTPATIENT
Start: 2024-02-01

## 2024-02-01 RX ADMIN — LIDOCAINE HYDROCHLORIDE 4 ML: 10 INJECTION, SOLUTION INFILTRATION; PERINEURAL at 08:22

## 2024-02-01 RX ADMIN — TRIAMCINOLONE ACETONIDE 40 MG: 40 INJECTION, SUSPENSION INTRA-ARTICULAR; INTRAMUSCULAR at 08:22

## 2024-02-01 NOTE — LETTER
2/1/2024         RE: Padmini Alan  [address] [facility] [id number]        Dear Colleague,    Thank you for referring your patient, Padmini Alan, to the [facility] [place]. Please see a copy of my visit note below.    ESTABLISHED PATIENT FOLLOW-UP:  Follow Up of the Right Knee and Follow Up of the Left Knee     HISTORY OF PRESENT ILLNESS  Ms. Alan is a pleasant 63 year old year old female who presents to clinic today for follow-up of bilateral knee pain    Date of injury: 5 years  Date last seen: 11/29/23  Following Therapeutic Plan: tylenol  Pain: Pain returned after the CSI, achy and sore  Function: Difficulty walking and weight bearing   Interval History: bilateral knee CSI at last office visit that provided about 8 weeks of relief.     Additional medical/Social/Surgical histories reviewed in [facility] and updated as appropriate.    REVIEW OF SYSTEMS (2/1/2024)  CONSTITUTIONAL: Denies fever and weight loss  GASTROINTESTINAL: Denies abdominal pain, nausea, vomiting  MUSCULOSKELETAL: See HPI  SKIN: Denies any recent rash or lesion  NEUROLOGICAL: Denies numbness or focal weakness     PHYSICAL EXAM  LMP 12/23/2004     General  - normal appearance, in no obvious distress  Musculoskeletal - bilateral knee  - stance: mildly antalgic gait  - inspection: trace effusion, mild quad atrophy  - palpation: medial joint line tenderness bilaterally  - ROM: 120 degrees flexion, 0 degrees extension, painful terminal flexion  - strength: 5/5 in flexion, 4/5 in extension   - special tests:  (-) varus at 0 and 30 degrees flexion  (-) valgus at 0 and 30 degrees flexion  Neuro  - no sensory or motor deficit, grossly normal coordination, normal muscle tone     IMAGING :     KNEE THREE VIEWS BILATERAL  11/29/2023 9:54 AM      HISTORY: Chronic pain of both knees  COMPARISON: None.    IMPRESSION:      Right knee: No acute fracture or malalignment. Severe medial  compartment with degenerative  changes with bone-on-bone articulation.  Mild lateral and patellofemoral compartment degenerative changes.  Trace knee joint effusion.     Left knee: No acute fracture or malalignment. Moderate to severe  medial compartment joint space narrowing. Mild lateral and  patellofemoral compartment degenerative changes. No knee joint  effusion.     MERE ROBBINS MD      ASSESSMENT & PLAN  Ms. Alan is a 63 year old year old female who presents to clinic today with Follow Up of the Right Knee and Follow Up of the Left Knee    Diagnosis:  Primary osteoarthritis of bilateral knees    -Encouraged her on her weight loss journey that she is embarking on currently  -Although it has not been 3 months, she will be traveling on a vacation in 2 weeks. After discussing risks/benefits, agreed to repeat injections today prior to 3 months  -Reviewed  braces as an option, consider in the future  -Discussed Zilretta if this second injection lasts <<3months again.  Padmini will mychart message me when pain returns and we can achieve PA for Zilretta if <3 months relief.    PROCEDURE    Bilateral Knee Injection - Intraarticular  The patient was informed of the risks and the benefits of the procedure and a written consent was signed.  The patient s left knee was prepped with chlorhexidine in sterile fashion.   40 mg of triamcinolone suspension was drawn up into a 5 mL syringe with 4 mL of 1% lidocaine.  Injection was performed using substerile technique.  A 1.5-inch 22-gauge needle was used to enter the anterolateral aspect of the left knee.  Injection performed successfully without difficulty.  There were no complications. The patient tolerated the procedure well. There was negligible bleeding.   This procedure as above was repeated for the right knee.  The patient s right knee was prepped with chlorhexidine in sterile fashion. Injection was performed using substerile technique.  A 1.5-inch 22-gauge needle was used to enter the  anterolateral aspect of the right knee.  Injection performed successfully without difficulty.  There were no complications. The patient tolerated the procedure well. There was negligible bleeding.   The patient was instructed to ice the knee upon leaving clinic and refrain from overuse over the next 3 days.   The patient was instructed to call or go to the emergency room with any unusual pain, swelling, redness, or if otherwise concerned.  A follow up appointment will be scheduled to evaluate response to the injection, and to assess range of motion and pain.     It was a pleasure seeing Padmini.    Eleazar Barney DO, [facility]  Primary Care Sports Medicine     Large Joint Injection/Arthocentesis: bilateral knee    Date/Time: 2/1/2024 8:22 AM    Performed by: Eleazar Barney DO  Authorized by: Eleazar Barney DO    Indications:  Pain  Needle Size:  22 G  Guidance: landmark guided    Approach:  Anterolateral  Location:  Knee  Laterality:  Bilateral      Medications (Right):  40 mg triamcinolone 40 MG/ML; 4 mL lidocaine 1 %  Medications (Left):  40 mg triamcinolone 40 MG/ML; 4 mL lidocaine 1 %  Outcome:  Tolerated well, no immediate complications  Procedure discussed: discussed risks, benefits, and alternatives    Consent Given by:  Patient  Timeout: timeout called immediately prior to procedure    Prep: patient was prepped and draped in usual sterile fashion          Again, thank you for allowing me to participate in the care of your patient.        Sincerely,        Eleazar Barney DO

## 2024-02-01 NOTE — PROGRESS NOTES
ESTABLISHED PATIENT FOLLOW-UP:  Follow Up of the Right Knee and Follow Up of the Left Knee     HISTORY OF PRESENT ILLNESS  Ms. Alan is a pleasant 63 year old year old female who presents to clinic today for follow-up of bilateral knee pain    Date of injury: 5 years  Date last seen: 11/29/23  Following Therapeutic Plan: tylenol  Pain: Pain returned after the CSI, achy and sore  Function: Difficulty walking and weight bearing   Interval History: bilateral knee CSI at last office visit that provided about 8 weeks of relief.     Additional medical/Social/Surgical histories reviewed in [facility] and updated as appropriate.    REVIEW OF SYSTEMS (2/1/2024)  CONSTITUTIONAL: Denies fever and weight loss  GASTROINTESTINAL: Denies abdominal pain, nausea, vomiting  MUSCULOSKELETAL: See HPI  SKIN: Denies any recent rash or lesion  NEUROLOGICAL: Denies numbness or focal weakness     PHYSICAL EXAM  LMP 12/23/2004     General  - normal appearance, in no obvious distress  Musculoskeletal - bilateral knee  - stance: mildly antalgic gait  - inspection: trace effusion, mild quad atrophy  - palpation: medial joint line tenderness bilaterally  - ROM: 120 degrees flexion, 0 degrees extension, painful terminal flexion  - strength: 5/5 in flexion, 4/5 in extension   - special tests:  (-) varus at 0 and 30 degrees flexion  (-) valgus at 0 and 30 degrees flexion  Neuro  - no sensory or motor deficit, grossly normal coordination, normal muscle tone     IMAGING :     KNEE THREE VIEWS BILATERAL  11/29/2023 9:54 AM      HISTORY: Chronic pain of both knees  COMPARISON: None.    IMPRESSION:      Right knee: No acute fracture or malalignment. Severe medial  compartment with degenerative changes with bone-on-bone articulation.  Mild lateral and patellofemoral compartment degenerative changes.  Trace knee joint effusion.     Left knee: No acute fracture or malalignment. Moderate to severe  medial compartment joint space narrowing. Mild lateral  and  patellofemoral compartment degenerative changes. No knee joint  effusion.     MERE ROBBINS MD      ASSESSMENT & PLAN  Ms. Alan is a 63 year old year old female who presents to clinic today with Follow Up of the Right Knee and Follow Up of the Left Knee    Diagnosis:  Primary osteoarthritis of bilateral knees    -Encouraged her on her weight loss journey that she is embarking on currently  -Although it has not been 3 months, she will be traveling on a vacation in 2 weeks. After discussing risks/benefits, agreed to repeat injections today prior to 3 months  -Reviewed  braces as an option, consider in the future  -Discussed Zilretta if this second injection lasts <<3months again.  Padmini will mychart message me when pain returns and we can achieve PA for Zilretta if <3 months relief.    PROCEDURE    Bilateral Knee Injection - Intraarticular  The patient was informed of the risks and the benefits of the procedure and a written consent was signed.  The patient s left knee was prepped with chlorhexidine in sterile fashion.   40 mg of triamcinolone suspension was drawn up into a 5 mL syringe with 4 mL of 1% lidocaine.  Injection was performed using substerile technique.  A 1.5-inch 22-gauge needle was used to enter the anterolateral aspect of the left knee.  Injection performed successfully without difficulty.  There were no complications. The patient tolerated the procedure well. There was negligible bleeding.   This procedure as above was repeated for the right knee.  The patient s right knee was prepped with chlorhexidine in sterile fashion. Injection was performed using substerile technique.  A 1.5-inch 22-gauge needle was used to enter the anterolateral aspect of the right knee.  Injection performed successfully without difficulty.  There were no complications. The patient tolerated the procedure well. There was negligible bleeding.   The patient was instructed to ice the knee upon leaving clinic  and refrain from overuse over the next 3 days.   The patient was instructed to call or go to the emergency room with any unusual pain, swelling, redness, or if otherwise concerned.  A follow up appointment will be scheduled to evaluate response to the injection, and to assess range of motion and pain.     It was a pleasure seeing Nabila Barney DO, [facility]  Primary Care Sports Medicine     Large Joint Injection/Arthocentesis: bilateral knee    Date/Time: 2/1/2024 8:22 AM    Performed by: Eleazar Barney DO  Authorized by: Eleazar Barney DO    Indications:  Pain  Needle Size:  22 G  Guidance: landmark guided    Approach:  Anterolateral  Location:  Knee  Laterality:  Bilateral      Medications (Right):  40 mg triamcinolone 40 MG/ML; 4 mL lidocaine 1 %  Medications (Left):  40 mg triamcinolone 40 MG/ML; 4 mL lidocaine 1 %  Outcome:  Tolerated well, no immediate complications  Procedure discussed: discussed risks, benefits, and alternatives    Consent Given by:  Patient  Timeout: timeout called immediately prior to procedure    Prep: patient was prepped and draped in usual sterile fashion

## 2024-05-25 ENCOUNTER — HEALTH MAINTENANCE LETTER (OUTPATIENT)
Age: 64
End: 2024-05-25

## 2024-08-15 ENCOUNTER — ALLIED HEALTH/NURSE VISIT (OUTPATIENT)
Dept: INTERNAL MEDICINE | Facility: CLINIC | Age: 64
End: 2024-08-15
Payer: COMMERCIAL

## 2024-08-15 VITALS — SYSTOLIC BLOOD PRESSURE: 116 MMHG | DIASTOLIC BLOOD PRESSURE: 60 MMHG | HEART RATE: 70 BPM

## 2024-08-15 DIAGNOSIS — Z01.30 BP CHECK: Primary | ICD-10-CM

## 2024-08-15 PROCEDURE — 99207 PR NO CHARGE NURSE ONLY: CPT | Performed by: INTERNAL MEDICINE

## 2024-08-15 NOTE — PROGRESS NOTES
Padmini Alan was evaluated at [facility] on August 15, 2024 at which time her blood pressure was:    BP Readings from Last 1 Encounters:   08/15/24 116/60     No data recorded      Reviewed lifestyle modifications for blood pressure control and reduction: including making healthy food choices, managing weight, getting regular exercise, smoking cessation, reducing alcohol consumption, monitoring blood pressure regularly.     Symptoms: None    BP Goal:< 140/90 mmHg    BP Assessment:  BP at goal    Potential Reasons for BP too high: NA - Not applicable    BP Follow-Up Plan: Recheck BP in 6 months at pharmacy    Note completed by: Krzysztof Pascual

## 2024-12-18 DIAGNOSIS — I10 HYPERTENSION, UNSPECIFIED TYPE: ICD-10-CM

## 2024-12-19 RX ORDER — AMLODIPINE AND BENAZEPRIL HYDROCHLORIDE 5; 40 MG/1; MG/1
1 CAPSULE ORAL DAILY
Qty: 90 CAPSULE | Refills: 1 | Status: SHIPPED | OUTPATIENT
Start: 2024-12-19

## 2025-01-14 ENCOUNTER — LAB (OUTPATIENT)
Dept: LAB | Facility: CLINIC | Age: 65
End: 2025-01-14
Payer: COMMERCIAL

## 2025-01-14 DIAGNOSIS — E03.9 HYPOTHYROIDISM, UNSPECIFIED TYPE: ICD-10-CM

## 2025-01-14 DIAGNOSIS — I10 ESSENTIAL HYPERTENSION: ICD-10-CM

## 2025-01-14 DIAGNOSIS — E21.3 HYPERPARATHYROIDISM: ICD-10-CM

## 2025-01-14 DIAGNOSIS — Z13.6 CARDIOVASCULAR SCREENING; LDL GOAL LESS THAN 100: ICD-10-CM

## 2025-01-14 LAB
ALBUMIN SERPL BCG-MCNC: 4 G/DL (ref 3.5–5.2)
ALP SERPL-CCNC: 65 U/L (ref 40–150)
ALT SERPL W P-5'-P-CCNC: 14 U/L (ref 0–50)
ANION GAP SERPL CALCULATED.3IONS-SCNC: 12 MMOL/L (ref 7–15)
AST SERPL W P-5'-P-CCNC: 18 U/L (ref 0–45)
BILIRUB SERPL-MCNC: 0.3 MG/DL
BUN SERPL-MCNC: 16.3 MG/DL (ref 8–23)
CALCIUM SERPL-MCNC: 10.2 MG/DL (ref 8.8–10.4)
CHLORIDE SERPL-SCNC: 105 MMOL/L (ref 98–107)
CHOLEST SERPL-MCNC: 192 MG/DL
CREAT SERPL-MCNC: 1.08 MG/DL (ref 0.51–0.95)
EGFRCR SERPLBLD CKD-EPI 2021: 56 ML/MIN/1.73M2
ERYTHROCYTE [DISTWIDTH] IN BLOOD BY AUTOMATED COUNT: 17.9 % (ref 10–15)
FASTING STATUS PATIENT QL REPORTED: NO
FASTING STATUS PATIENT QL REPORTED: NO
GLUCOSE SERPL-MCNC: 108 MG/DL (ref 70–99)
HCO3 SERPL-SCNC: 22 MMOL/L (ref 22–29)
HCT VFR BLD AUTO: 33.1 % (ref 35–47)
HDLC SERPL-MCNC: 52 MG/DL
HGB BLD-MCNC: 10.1 G/DL (ref 11.7–15.7)
LDLC SERPL CALC-MCNC: 107 MG/DL
MCH RBC QN AUTO: 21.4 PG (ref 26.5–33)
MCHC RBC AUTO-ENTMCNC: 30.5 G/DL (ref 31.5–36.5)
MCV RBC AUTO: 70 FL (ref 78–100)
NONHDLC SERPL-MCNC: 140 MG/DL
PLATELET # BLD AUTO: 295 10E3/UL (ref 150–450)
POTASSIUM SERPL-SCNC: 4.3 MMOL/L (ref 3.4–5.3)
PROT SERPL-MCNC: 6.9 G/DL (ref 6.4–8.3)
PTH-INTACT SERPL-MCNC: 117 PG/ML (ref 15–65)
RBC # BLD AUTO: 4.72 10E6/UL (ref 3.8–5.2)
SODIUM SERPL-SCNC: 139 MMOL/L (ref 135–145)
TRIGL SERPL-MCNC: 163 MG/DL
TSH SERPL DL<=0.005 MIU/L-ACNC: 0.3 UIU/ML (ref 0.3–4.2)
WBC # BLD AUTO: 5.6 10E3/UL (ref 4–11)

## 2025-01-14 PROCEDURE — 80053 COMPREHEN METABOLIC PANEL: CPT

## 2025-01-14 PROCEDURE — 85027 COMPLETE CBC AUTOMATED: CPT

## 2025-01-14 PROCEDURE — 36415 COLL VENOUS BLD VENIPUNCTURE: CPT

## 2025-01-14 PROCEDURE — 80061 LIPID PANEL: CPT

## 2025-01-14 PROCEDURE — 83970 ASSAY OF PARATHORMONE: CPT

## 2025-01-14 PROCEDURE — 84443 ASSAY THYROID STIM HORMONE: CPT

## 2025-02-11 ENCOUNTER — TELEPHONE (OUTPATIENT)
Dept: INTERNAL MEDICINE | Facility: CLINIC | Age: 65
End: 2025-02-11
Payer: COMMERCIAL

## 2025-02-11 NOTE — TELEPHONE ENCOUNTER
Reason for Call:  Appointment Request    Patient requesting this type of appt:  Preventive     Requested provider: Maico Bell    Reason patient unable to be scheduled: Not with their preferred provider    When does patient want to be seen/preferred time:  April 2025    Comments: PCP called patient to reschedule and their schedule I snot populating appointments at this time for annual physicals    Could we send this information to you in [facility] or would you prefer to receive a phone call?:   Patient would like to be contacted via [employer]    Call taken on 2/11/2025 at 3:13 PM by Analisa Alan

## 2025-04-04 SDOH — HEALTH STABILITY: PHYSICAL HEALTH: ON AVERAGE, HOW MANY DAYS PER WEEK DO YOU ENGAGE IN MODERATE TO STRENUOUS EXERCISE (LIKE A BRISK WALK)?: 0 DAYS

## 2025-04-04 SDOH — HEALTH STABILITY: PHYSICAL HEALTH: ON AVERAGE, HOW MANY MINUTES DO YOU ENGAGE IN EXERCISE AT THIS LEVEL?: 10 MIN

## 2025-04-04 ASSESSMENT — SOCIAL DETERMINANTS OF HEALTH (SDOH): HOW OFTEN DO YOU GET TOGETHER WITH FRIENDS OR RELATIVES?: ONCE A WEEK

## 2025-04-09 ENCOUNTER — OFFICE VISIT (OUTPATIENT)
Dept: INTERNAL MEDICINE | Facility: CLINIC | Age: 65
End: 2025-04-09
Payer: COMMERCIAL

## 2025-04-09 VITALS
SYSTOLIC BLOOD PRESSURE: 119 MMHG | DIASTOLIC BLOOD PRESSURE: 74 MMHG | TEMPERATURE: 97.3 F | HEART RATE: 77 BPM | WEIGHT: 256.3 LBS | RESPIRATION RATE: 18 BRPM | OXYGEN SATURATION: 98 % | BODY MASS INDEX: 45.41 KG/M2 | HEIGHT: 63 IN

## 2025-04-09 DIAGNOSIS — M17.0 OSTEOARTHRITIS OF BOTH KNEES, UNSPECIFIED OSTEOARTHRITIS TYPE: ICD-10-CM

## 2025-04-09 DIAGNOSIS — Z23 NEED FOR PROPHYLACTIC VACCINATION WITH TETANUS-DIPHTHERIA (TD): ICD-10-CM

## 2025-04-09 DIAGNOSIS — E03.9 HYPOTHYROIDISM, UNSPECIFIED TYPE: ICD-10-CM

## 2025-04-09 DIAGNOSIS — F41.1 ANXIETY STATE: ICD-10-CM

## 2025-04-09 DIAGNOSIS — E66.01 MORBID OBESITY, UNSPECIFIED OBESITY TYPE (H): ICD-10-CM

## 2025-04-09 DIAGNOSIS — Z00.01 ENCOUNTER FOR ROUTINE ADULT MEDICAL EXAM WITH ABNORMAL FINDINGS: Primary | ICD-10-CM

## 2025-04-09 DIAGNOSIS — Z12.31 VISIT FOR SCREENING MAMMOGRAM: ICD-10-CM

## 2025-04-09 DIAGNOSIS — D64.9 ANEMIA, UNSPECIFIED TYPE: ICD-10-CM

## 2025-04-09 DIAGNOSIS — I10 HYPERTENSION, UNSPECIFIED TYPE: ICD-10-CM

## 2025-04-09 DIAGNOSIS — Z78.0 ASYMPTOMATIC POSTMENOPAUSAL STATUS: Primary | ICD-10-CM

## 2025-04-09 DIAGNOSIS — N18.31 STAGE 3A CHRONIC KIDNEY DISEASE (H): ICD-10-CM

## 2025-04-09 DIAGNOSIS — E78.5 HYPERLIPIDEMIA LDL GOAL <100: ICD-10-CM

## 2025-04-09 DIAGNOSIS — E21.3 HYPERPARATHYROIDISM: ICD-10-CM

## 2025-04-09 DIAGNOSIS — Z78.0 ASYMPTOMATIC POSTMENOPAUSAL STATUS: ICD-10-CM

## 2025-04-09 LAB
ANION GAP SERPL CALCULATED.3IONS-SCNC: 11 MMOL/L (ref 7–15)
BUN SERPL-MCNC: 11.8 MG/DL (ref 8–23)
CALCIUM SERPL-MCNC: 10.3 MG/DL (ref 8.8–10.4)
CHLORIDE SERPL-SCNC: 104 MMOL/L (ref 98–107)
CREAT SERPL-MCNC: 1.05 MG/DL (ref 0.51–0.95)
EGFRCR SERPLBLD CKD-EPI 2021: 58 ML/MIN/1.73M2
ERYTHROCYTE [DISTWIDTH] IN BLOOD BY AUTOMATED COUNT: 18.5 % (ref 10–15)
FERRITIN SERPL-MCNC: 8 NG/ML (ref 11–328)
GLUCOSE SERPL-MCNC: 99 MG/DL (ref 70–99)
HCO3 SERPL-SCNC: 23 MMOL/L (ref 22–29)
HCT VFR BLD AUTO: 30.3 % (ref 35–47)
HGB BLD-MCNC: 9.2 G/DL (ref 11.7–15.7)
IRON BINDING CAPACITY (ROCHE): 326 UG/DL (ref 240–430)
IRON SATN MFR SERPL: 5 % (ref 15–46)
IRON SERPL-MCNC: 16 UG/DL (ref 37–145)
MCH RBC QN AUTO: 20.5 PG (ref 26.5–33)
MCHC RBC AUTO-ENTMCNC: 30.4 G/DL (ref 31.5–36.5)
MCV RBC AUTO: 68 FL (ref 78–100)
PLATELET # BLD AUTO: 365 10E3/UL (ref 150–450)
POTASSIUM SERPL-SCNC: 5.1 MMOL/L (ref 3.4–5.3)
PTH-INTACT SERPL-MCNC: 102 PG/ML (ref 15–65)
RBC # BLD AUTO: 4.48 10E6/UL (ref 3.8–5.2)
SODIUM SERPL-SCNC: 138 MMOL/L (ref 135–145)
WBC # BLD AUTO: 6.5 10E3/UL (ref 4–11)

## 2025-04-09 PROCEDURE — 83540 ASSAY OF IRON: CPT | Performed by: INTERNAL MEDICINE

## 2025-04-09 PROCEDURE — 83550 IRON BINDING TEST: CPT | Performed by: INTERNAL MEDICINE

## 2025-04-09 PROCEDURE — 99214 OFFICE O/P EST MOD 30 MIN: CPT | Mod: 25 | Performed by: INTERNAL MEDICINE

## 2025-04-09 PROCEDURE — 80048 BASIC METABOLIC PNL TOTAL CA: CPT | Performed by: INTERNAL MEDICINE

## 2025-04-09 PROCEDURE — 85027 COMPLETE CBC AUTOMATED: CPT | Performed by: INTERNAL MEDICINE

## 2025-04-09 PROCEDURE — 82728 ASSAY OF FERRITIN: CPT | Performed by: INTERNAL MEDICINE

## 2025-04-09 PROCEDURE — 90471 IMMUNIZATION ADMIN: CPT | Performed by: INTERNAL MEDICINE

## 2025-04-09 PROCEDURE — 36415 COLL VENOUS BLD VENIPUNCTURE: CPT | Performed by: INTERNAL MEDICINE

## 2025-04-09 PROCEDURE — 99397 PER PM REEVAL EST PAT 65+ YR: CPT | Mod: 25 | Performed by: INTERNAL MEDICINE

## 2025-04-09 PROCEDURE — 83970 ASSAY OF PARATHORMONE: CPT | Performed by: INTERNAL MEDICINE

## 2025-04-09 PROCEDURE — 90714 TD VACC NO PRESV 7 YRS+ IM: CPT | Performed by: INTERNAL MEDICINE

## 2025-04-09 RX ORDER — AMLODIPINE AND BENAZEPRIL HYDROCHLORIDE 5; 40 MG/1; MG/1
1 CAPSULE ORAL DAILY
Qty: 90 CAPSULE | Refills: 1 | Status: SHIPPED | OUTPATIENT
Start: 2025-04-09 | End: 2025-04-09

## 2025-04-09 RX ORDER — LEVOTHYROXINE SODIUM 125 UG/1
TABLET ORAL
Qty: 90 TABLET | Refills: 0 | Status: SHIPPED | OUTPATIENT
Start: 2025-04-09 | End: 2025-04-09

## 2025-04-09 RX ORDER — LEVOTHYROXINE SODIUM 125 UG/1
TABLET ORAL
Qty: 90 TABLET | Refills: 3 | Status: SHIPPED | OUTPATIENT
Start: 2025-04-09

## 2025-04-09 RX ORDER — ESCITALOPRAM OXALATE 20 MG/1
20 TABLET ORAL DAILY
Qty: 90 TABLET | Refills: 0 | OUTPATIENT
Start: 2025-04-09

## 2025-04-09 RX ORDER — LORAZEPAM 0.5 MG/1
TABLET ORAL
Qty: 15 TABLET | Refills: 0 | Status: CANCELLED | OUTPATIENT
Start: 2025-04-09

## 2025-04-09 RX ORDER — ESCITALOPRAM OXALATE 20 MG/1
20 TABLET ORAL DAILY
Qty: 90 TABLET | Refills: 3 | Status: SHIPPED | OUTPATIENT
Start: 2025-04-09

## 2025-04-09 RX ORDER — AMLODIPINE AND BENAZEPRIL HYDROCHLORIDE 5; 40 MG/1; MG/1
1 CAPSULE ORAL DAILY
Qty: 90 CAPSULE | Refills: 3 | Status: SHIPPED | OUTPATIENT
Start: 2025-04-09

## 2025-04-09 NOTE — PROGRESS NOTES
Preventive Care Visit  [facility]  Maico Bell MD, Internal Medicine  Apr 9, 2025  {Provider  Link to [facility] :717469}    {PROVIDER CHARTING PREFERENCE:141022}    Michelle Washington is a 65 year old, presenting for the following:  Wellness Visit         HPI  ***   {MA/LPN/RN Pre-Provider Visit Orders- hCG/UA/Strep (Optional):987070}  {SUPERLIST (Optional):331737}  {additonal problems for provider to add (Optional):829711}  Advance Care Planning  Patient does not have a Health Care Directive: {ADVANCE_DIRECTIVE_STATUS:110488}      4/4/2025   General Health   How would you rate your overall physical health? Good   Feel stress (tense, anxious, or unable to sleep) To some extent   (!) STRESS CONCERN      4/4/2025   Nutrition   Diet: I don't know         4/4/2025   Exercise   Days per week of moderate/strenous exercise 0 days   Average minutes spent exercising at this level 10 min   (!) EXERCISE CONCERN      4/4/2025   Social Factors   Frequency of gathering with friends or relatives Once a week   Worry food won't last until get money to buy more No   Food not last or not have enough money for food? No   Do you have housing? (Housing is defined as stable permanent housing and does not include staying ouside in a car, in a tent, in an abandoned building, in an overnight shelter, or couch-surfing.) Yes   Are you worried about losing your housing? No   Lack of transportation? No   Unable to get utilities (heat,electricity)? No         4/9/2025   Fall Risk   Gait Speed Test (Document in seconds) 3.97   Gait Speed Test Interpretation Less than or equal to 5.00 seconds - PASS          4/4/2025   Activities of Daily Living- Home Safety   Needs help with the following daily activites None of the above   Safety concerns in the home None of the above         4/4/2025   Dental   Dentist two times every year? (!) NO         4/4/2025   Hearing Screening   Hearing concerns? None of the above         4/4/2025    Driving Risk Screening   Patient/family members have concerns about driving No         2025   General Alertness/Fatigue Screening   Have you been more tired than usual lately? (!) YES         2025   Urinary Incontinence Screening   Bothered by leaking urine in past 6 months Yes           Today's PHQ-2 Score:       2025     9:57 AM   PHQ-2 (  [employer])   Q1: Little interest or pleasure in doing things 0    Q2: Feeling down, depressed or hopeless 0    PHQ-2 Score 0    Q1: Little interest or pleasure in doing things Not at all   Q2: Feeling down, depressed or hopeless Not at all   PHQ-2 Score 0       Proxy-reported           2025   Substance Use   Alcohol more than 3/day or more than 7/wk No   Do you have a current opioid prescription? No   How severe/bad is pain from 1 to 10? 6/10   Do you use any other substances recreationally? No     Social History     Tobacco Use    Smoking status: Former     Current packs/day: 0.00     Types: Cigarettes     Quit date: 1987     Years since quittin.2    Smokeless tobacco: Never   Vaping Use    Vaping status: Never Used   Substance Use Topics    Alcohol use: Yes     Comment: socially    Drug use: No     {Provider  If there are gaps in the social history shown above, please follow the link to update and then refresh the note Link to Social and Substance History :050422}     {Mammogram Decision Support (Optional):326963}      History of abnormal Pap smear: No - age 65 or older with adequate negative prior screening test results (3 consecutive negative cytology results, 2 consecutive negative cotesting results, or 2 consecutive negative HrHPV test results within 10 years, with the most recent test occurring within the recommended screening interval for the test used)        Latest Ref Rng & Units 2020    10:20 AM 2020    10:10 AM   PAP / HPV   PAP (Historical)   NIL    HPV 16 DNA NEG^Negative Negative     HPV 18 DNA NEG^Negative Negative     Other  HR HPV NEG^Negative Negative       ASCVD Risk   The 10-year ASCVD risk score (Trenton MUJICA, et al., 2019) is: 8%    Values used to calculate the score:      Age: 65 years      Sex: Female      Is Non- : No      Diabetic: No      Tobacco smoker: No      Systolic Blood Pressure: 119 mmHg      Is BP treated: Yes      HDL Cholesterol: 52 mg/dL      Total Cholesterol: 192 mg/dL    {Link to Fracture Risk Assessment Tool (Optional):593274}    {Provider  REQUIRED FOR AWV Use the storyboard to review patient history, after sections have been marked as reviewed, refresh note to capture documentation:464962}  {Provider   REQUIRED AWV use this link to review and update sexual activity history  after section has been marked as reviewed, refresh note to capture documentation:495379}  Reviewed and updated as needed this visit by Provider                    {HISTORY OPTIONS (Optional):995803}  Current providers sharing in care for this patient include:  Patient Care Team:  Maico Bell MD as PCP - General  Maico Bell MD as Assigned PCP  Eleazar Barney DO as Assigned Musculoskeletal Provider    The following health maintenance items are reviewed in Epic and correct as of today:  Health Maintenance   Topic Date Due    DEXA  Never done    MAMMO SCREENING  Never done    ZOSTER IMMUNIZATION (1 of 2) Never done    ANNUAL REVIEW OF HM ORDERS  07/26/2023    DTAP/TDAP/TD IMMUNIZATION (2 - Td or Tdap) 08/25/2024    INFLUENZA VACCINE (1) 09/01/2024    COVID-19 Vaccine (3 - 2024-25 season) 09/01/2024    MEDICARE ANNUAL WELLNESS VISIT  01/15/2025    BMP  01/14/2026    TSH W/FREE T4 REFLEX  01/14/2026    FALL RISK ASSESSMENT  04/09/2026    COLORECTAL CANCER SCREENING  11/15/2026    DIABETES SCREENING  01/14/2028    ADVANCE CARE PLANNING  01/19/2029    LIPID  01/14/2030    RSV VACCINE (1 - 1-dose 75+ series) 02/23/2033    HEPATITIS C SCREENING  Completed    PHQ-2 (once per calendar year)  Completed     "Pneumococcal Vaccine: 50+ Years  Completed    HPV IMMUNIZATION  Aged Out    MENINGITIS IMMUNIZATION  Aged Out    PAP  Discontinued         Review of Systems  CONSTITUTIONAL: NEGATIVE for fever, chills, change in weight. Has some general fatigue  INTEGUMENTARY/SKIN: NEGATIVE for worrisome rashes, moles or lesions  EYES: NEGATIVE for vision changes or irritation  ENT/MOUTH: NEGATIVE for ear, mouth and throat problems  RESP: NEGATIVE for significant cough or SOB  BREAST: NEGATIVE for masses, tenderness or discharge  CV: NEGATIVE for chest pain, palpitations or peripheral edema  GI: NEGATIVE for nausea, abdominal pain, heartburn, or change in bowel habits  : NEGATIVE for frequency, dysuria, or hematuria.Nocturia 2-3. Has one decaf coffee in AM. Has   Snapple tea  MUSCULOSKELETAL:  POSITIVE for bilateral knee pain. Hx prior steroid injections. Seeing chiropractor also. Seeing [facility] intermittently  NEURO: NEGATIVE for weakness, dizziness or paresthesias  ENDOCRINE: NEGATIVE for temperature intolerance. Hx obesity  HEME: NEGATIVE for bleeding problems  PSYCHIATRIC: NEGATIVE for changes in mood or affect overall. Anxiety with flying  Occ insomnia.      Objective    Exam  /74   Pulse 77   Temp 97.3  F (36.3  C) (Temporal)   Resp 18   Ht 1.6 m (5' 3\")   Wt 116.3 kg (256 lb 4.8 oz)   LMP 12/23/2004   SpO2 98%   BMI 45.40 kg/m     Estimated body mass index is 45.4 kg/m  as calculated from the following:    Height as of this encounter: 1.6 m (5' 3\").    Weight as of this encounter: 116.3 kg (256 lb 4.8 oz).    Physical Exam  {Exam Choices (Optional):745363}        4/9/2025   Mini Cog   Clock Draw Score 2 Normal   3 Item Recall 3 objects recalled   Mini Cog Total Score 5     {A Mini-Cog total score of 0-2 suggests the possibility of dementia, score of 3-5 suggests no dementia:160456}        Signed Electronically by: Maico Bell MD  {Email feedback regarding this note to " [email or web address]   :780074}   health care maintenance as above.  Also reviewed immunization guidelines,   eye screening for vision/glaucoma at last every other year, routine cancer screenings including annual skin cancer check with myself or dermatology, colon cancer screening for everyone until age 75 and then individualized to age 80,  mammogram screening in women to age 75 and then individualized to age 80, PAP/pelvic for women until age 65.  DEXA/bone density status: due  See plan discussion above regarding healthcare maintenance recommendations       Current providers sharing in care for this patient include:  Patient Care Team:  Maico Bell MD as PCP - General  Maico Bell MD as Assigned PCP  Eleazar Barney DO as Assigned Musculoskeletal Provider    The following health maintenance items are reviewed in Epic and correct as of today:  Health Maintenance   Topic Date Due    DEXA  Never done    MAMMO SCREENING  Never done    ZOSTER IMMUNIZATION (1 of 2) Never done    ANNUAL REVIEW OF HM ORDERS  07/26/2023    DTAP/TDAP/TD IMMUNIZATION (2 - Td or Tdap) 08/25/2024    INFLUENZA VACCINE (1) 09/01/2024    COVID-19 Vaccine (3 - 2024-25 season) 09/01/2024    MEDICARE ANNUAL WELLNESS VISIT  01/15/2025    BMP  01/14/2026    TSH W/FREE T4 REFLEX  01/14/2026    FALL RISK ASSESSMENT  04/09/2026    COLORECTAL CANCER SCREENING  11/15/2026    DIABETES SCREENING  01/14/2028    ADVANCE CARE PLANNING  01/19/2029    LIPID  01/14/2030    RSV VACCINE (1 - 1-dose 75+ series) 02/23/2033    HEPATITIS C SCREENING  Completed    PHQ-2 (once per calendar year)  Completed    Pneumococcal Vaccine: 50+ Years  Completed    HPV IMMUNIZATION  Aged Out    MENINGITIS IMMUNIZATION  Aged Out    PAP  Discontinued       Component      Latest Ref Rng 1/14/2025  11:03 AM   Sodium      135 - 145 mmol/L 139    Potassium      3.4 - 5.3 mmol/L 4.3    Carbon Dioxide (CO2)      22 - 29 mmol/L 22    Anion Gap      7 - 15 mmol/L 12    Urea Nitrogen      8.0 - 23.0 mg/dL 16.3     Creatinine      0.51 - 0.95 mg/dL 1.08 (H)    GFR Estimate      >60 mL/min/1.73m2 56 (L)    Calcium      8.8 - 10.4 mg/dL 10.2    Chloride      98 - 107 mmol/L 105    Glucose      70 - 99 mg/dL 108 (H)    Alkaline Phosphatase      40 - 150 U/L 65    AST      0 - 45 U/L 18    ALT      0 - 50 U/L 14    Protein Total      6.4 - 8.3 g/dL 6.9    Albumin      3.5 - 5.2 g/dL 4.0    Bilirubin Total      <=1.2 mg/dL 0.3    Patient Fasting? No    Patient Fasting? No    WBC      4.0 - 11.0 10e3/uL 5.6    RBC Count      3.80 - 5.20 10e6/uL 4.72    Hemoglobin      11.7 - 15.7 g/dL 10.1 (L)    Hematocrit      35.0 - 47.0 % 33.1 (L)    MCV      78 - 100 fL 70 (L)    MCH      26.5 - 33.0 pg 21.4 (L)    MCHC      31.5 - 36.5 g/dL 30.5 (L)    RDW      10.0 - 15.0 % 17.9 (H)    Platelet Count      150 - 450 10e3/uL 295    Cholesterol      <200 mg/dL 192    Triglycerides      <150 mg/dL 163 (H)    HDL Cholesterol      >=50 mg/dL 52    LDL Cholesterol Calculated      <100 mg/dL 107 (H)    Non HDL Cholesterol      <130 mg/dL 140 (H)    Parathyroid Hormone Intact      15 - 65 pg/mL 117 (H)    TSH      0.30 - 4.20 uIU/mL 0.30       Legend:  (H) High  (L) Low        Review of Systems  CONSTITUTIONAL: NEGATIVE for fever, chills, change in weight. Has some general fatigue  INTEGUMENTARY/SKIN: NEGATIVE for worrisome rashes, moles or lesions  EYES: NEGATIVE for vision changes or irritation  ENT/MOUTH: NEGATIVE for ear, mouth and throat problems  RESP: NEGATIVE for significant cough or SOB  BREAST: NEGATIVE for masses, tenderness or discharge  CV: NEGATIVE for chest pain, palpitations or peripheral edema  GI: NEGATIVE for nausea, abdominal pain, heartburn, or change in bowel habits  : NEGATIVE for frequency, dysuria, or hematuria.Nocturia 2-3. Has one decaf coffee in AM. Has  Snapple tea  MUSCULOSKELETAL:  POSITIVE for bilateral knee pain. Hx prior steroid injections. Seeing chiropractor also. Seeing [facility] intermittently  NEURO: NEGATIVE  "for weakness, dizziness or paresthesias  ENDOCRINE: NEGATIVE for temperature intolerance. Hx obesity  HEME: NEGATIVE for bleeding problems  PSYCHIATRIC: NEGATIVE for changes in mood or affect overall. Anxiety with flying  Occ insomnia.      Objective    Exam  /74   Pulse 77   Temp 97.3  F (36.3  C) (Temporal)   Resp 18   Ht 1.6 m (5' 3\")   Wt 116.3 kg (256 lb 4.8 oz)   LMP 12/23/2004   SpO2 98%   BMI 45.40 kg/m     Estimated body mass index is 45.4 kg/m  as calculated from the following:    Height as of this encounter: 1.6 m (5' 3\").    Weight as of this encounter: 116.3 kg (256 lb 4.8 oz).    Physical Exam  General appearance -  alert, no distress  Skin - No rashes or lesions.  Head - normocephalic, atraumatic  Eyes - LORENE, EOMI, fundi exam with nondilated pupils negative.  Ears - External ears normal. Canals clear. TM's normal.  Nose/Sinuses - Nares normal. Septum midline. Mucosa normal. No drainage or sinus tenderness.  Oropharynx - No erythema, no adenopathy, no exudates.  Neck - Supple without adenopathy or thyromegaly. No bruits.  Lungs - Clear to auscultation without wheezes/rhonchi.  Heart - Regular rate and rhythm without murmurs, clicks, or gallops.  Nodes - No supraclavicular, axillary, or inguinal adenopathy palpable.  Breasts - deferred  Abdomen - Abdomen obese, soft, non-tender. BS normal. No masses or hepatosplenomegaly palpable. No bruits.  Extremities -No cyanosis, clubbing. Mild BLE ankle edema.    Musculoskeletal - Spine ROM normal. Muscular strength intact.  Tenderness to palpation bilateral knee joint line.  No effusion.  Ligament exam grossly intact against resistance testing  Peripheral pulses - radial=4/4, femoral=4/4, posterior tibial=4/4, dorsalis pedis=4/4,  Neuro - Gait slower and mildly antalgic but stable without assistance.  Reflexes normal and symmetric. Sensation grossly WNL.  Genital/Rectal - deferred          4/9/2025   Mini Cog   Clock Draw Score 2 Normal   3 Item " Recall 3 objects recalled   Mini Cog Total Score 5            Signed Electronically by: Maico Bell MD

## 2025-04-10 ENCOUNTER — PATIENT OUTREACH (OUTPATIENT)
Dept: CARE COORDINATION | Facility: CLINIC | Age: 65
End: 2025-04-10
Payer: COMMERCIAL

## 2025-04-10 ENCOUNTER — MYC MEDICAL ADVICE (OUTPATIENT)
Dept: ORTHOPEDICS | Facility: CLINIC | Age: 65
End: 2025-04-10
Payer: COMMERCIAL

## 2025-04-10 DIAGNOSIS — M17.0 BILATERAL PRIMARY OSTEOARTHRITIS OF KNEE: Primary | ICD-10-CM

## 2025-04-10 NOTE — TELEPHONE ENCOUNTER
Patient scheduled for appointment on 4/24/25 @ [facility] for discussion of viscosupplementation injection vs steroid injection of bilateral knee.        Steroid  injection last completed 2/1/24.  Patient reports relief for 12 months    Patient has failed 3 month trial of Pharmacologic Approach (e.g., topical NSAIDs, oral NSAIDs with or without oral proton pump inhibitors, PIZANO-2 inhibitors, topical capsaicin, acetaminophen, tramadol, duloxetine, etc.):  Yes - takes advil, only helps a little     Patient has completed 3 month trial of Non-Pharmacologic treatments (i.e., physical, psychosocial, or mind-body approach (e.g., exercise-land based or aquatic, physical therapy, letitia chi, yoga, weight management, cognitive behavioral therapy, knee brace or cane, etc).  Yes- tried PT previously, some help. Also tried kne ebraces- not helpful    Has patient had prior reaction to Synvisc/SynviscOne or any alternative HA product?: No    Prior authorization referral for SynviscOne injection pended.    Please advise

## 2025-04-12 ENCOUNTER — HEALTH MAINTENANCE LETTER (OUTPATIENT)
Age: 65
End: 2025-04-12

## 2025-04-13 ENCOUNTER — APPOINTMENT (OUTPATIENT)
Dept: LAB | Facility: CLINIC | Age: 65
End: 2025-04-13
Payer: COMMERCIAL

## 2025-04-13 LAB
HEMOCCULT SP1 STL QL: POSITIVE
HEMOCCULT SP2 STL QL: POSITIVE
HEMOCCULT SP3 STL QL: NEGATIVE

## 2025-04-13 PROCEDURE — 82270 OCCULT BLOOD FECES: CPT | Performed by: INTERNAL MEDICINE

## 2025-04-20 PROBLEM — N18.31 STAGE 3A CHRONIC KIDNEY DISEASE (H): Status: ACTIVE | Noted: 2025-04-20

## 2025-04-20 PROBLEM — D64.9 ANEMIA, UNSPECIFIED TYPE: Status: ACTIVE | Noted: 2025-04-20

## 2025-04-20 PROBLEM — E78.5 HYPERLIPIDEMIA LDL GOAL <100: Status: ACTIVE | Noted: 2025-04-20

## 2025-04-20 PROBLEM — M17.0 OSTEOARTHRITIS OF BOTH KNEES, UNSPECIFIED OSTEOARTHRITIS TYPE: Status: ACTIVE | Noted: 2025-04-20

## 2025-04-20 PROBLEM — I10 HYPERTENSION, UNSPECIFIED TYPE: Status: ACTIVE | Noted: 2025-04-20

## 2025-04-21 NOTE — PATIENT INSTRUCTIONS
Continue current medications  Prescriptions refilled at your pharmacy.    Labs today as ordered  Stool cards for blood x3 samples.  in lab and return to the clinic when completed  Reduce calorie/carbohydrate (sugar, bread, potato, pasta, rice, alcohol etc)   and saturated fat  (red meats, fried and processed foods) intake in diet.  Increase color on your plate with vegetables. Increase  frequency of walking or other aerobic exercise as able (goal is daily)    Bone density (DEXA)   and Mammogram. These will be done at the [facility]. Call [phone] or use [employer] to schedule.   Schedule an eye exam appointment. Please ask the eye clinic to fax us a report of your eye exam to [phone], attention Dr Bell  Vaccination given today: Td vaccine  (tetanus risk reduction)  I would recommend  a  RSV vaccine (respiratory syncytial virus risk reduction). Get at a pharmacy  Would recommend updated COVID and influenza vaccinations in the Fall through a pharmacy  Call  [phone] or use [employer] to schedule a future lab appointment  fasting in 6 months to recheck cholesterol status. For fasting labs, please refrain from eating for 8 hours or more.   Drink 2 glasses of water before your lab appointment. It is fine to take your  oral medications on the morning of the lab test as usual   Healthcare  Directive discussed and given copy.   Patient to complete and return to clinic  Follow-up with [facility] re: knee pain  Pt was informed regarding extra E&M billing for management of new or established medical issues not related to today's wellness/screening visit

## 2025-04-23 ENCOUNTER — TELEPHONE (OUTPATIENT)
Dept: INTERNAL MEDICINE | Facility: CLINIC | Age: 65
End: 2025-04-23
Payer: COMMERCIAL

## 2025-04-23 DIAGNOSIS — D50.0 IRON DEFICIENCY ANEMIA DUE TO CHRONIC BLOOD LOSS: Primary | ICD-10-CM

## 2025-04-23 DIAGNOSIS — K92.2 GASTROINTESTINAL HEMORRHAGE, UNSPECIFIED GASTROINTESTINAL HEMORRHAGE TYPE: ICD-10-CM

## 2025-04-23 RX ORDER — OMEPRAZOLE 20 MG/1
20 CAPSULE, DELAYED RELEASE ORAL DAILY
Qty: 30 CAPSULE | Refills: 1 | Status: SHIPPED | OUTPATIENT
Start: 2025-04-23

## 2025-04-23 NOTE — TELEPHONE ENCOUNTER
Spoke with patient and reviewed recent labs including positive stool for blood and EFREN.  See [employer] message sent to patient with instructions regarding plan

## 2025-04-24 ENCOUNTER — LAB (OUTPATIENT)
Dept: LAB | Facility: CLINIC | Age: 65
End: 2025-04-24
Payer: COMMERCIAL

## 2025-04-24 DIAGNOSIS — D50.0 IRON DEFICIENCY ANEMIA DUE TO CHRONIC BLOOD LOSS: ICD-10-CM

## 2025-04-24 LAB — HGB BLD-MCNC: 8.1 G/DL (ref 11.7–15.7)

## 2025-04-30 ENCOUNTER — OFFICE VISIT (OUTPATIENT)
Dept: ORTHOPEDICS | Facility: CLINIC | Age: 65
End: 2025-04-30
Payer: COMMERCIAL

## 2025-04-30 DIAGNOSIS — M17.0 BILATERAL PRIMARY OSTEOARTHRITIS OF KNEE: Primary | ICD-10-CM

## 2025-04-30 PROCEDURE — 20610 DRAIN/INJ JOINT/BURSA W/O US: CPT | Mod: 50 | Performed by: FAMILY MEDICINE

## 2025-04-30 RX ORDER — LIDOCAINE HYDROCHLORIDE 10 MG/ML
3 INJECTION, SOLUTION INFILTRATION; PERINEURAL
Status: COMPLETED | OUTPATIENT
Start: 2025-04-30 | End: 2025-04-30

## 2025-04-30 RX ADMIN — LIDOCAINE HYDROCHLORIDE 3 ML: 10 INJECTION, SOLUTION INFILTRATION; PERINEURAL at 10:04

## 2025-04-30 NOTE — PROGRESS NOTES
PROCEDURE ENCOUNTER    [facility]  Sports Medicine Clinic  Eleazar Barney DO  2025     Name: Padmini Alan  MRN: [id number]  Age: 65 year old  : 1960    Diagnosis:  Primary osteoarthritis of bilateral knee    Knee Injection Bilateral with Hyaluronic Acid    The patient was informed of the risks and the benefits of the procedure and a written consent was signed.    The patient s right knee was prepped with chlorhexidine in sterile fashion.     Local anesthesia was performed using a 27-gauge 1.5-inch needle to administer 3 mL of 1% lidocaine without epi.     Synvisc One syringe and medication removed from packaging and examined for package consistency.    Injection was performed using sterile technique.  Needle placement using landmark guidance at anterolateral aspect of right knee.      The patient s left knee was prepped with chlorhexidine in sterile fashion.     Local anesthesia was performed using a 27-gauge 1.5-inch needle to administer 3 mL of 1% lidocaine without epi.     Synvisc One syringe and medication removed from packaging and examined for package consistency.    Injection was performed using sterile technique.  Needle placement using landmark guidance at anterolateral aspect of left knee.    There were no complications. The patient tolerated the procedure well. There was negligible bleeding.     The patient was instructed to ice the knee upon leaving clinic and refrain from overuse over the next 3 days.     The patient was instructed to call or go to the emergency room with any unusual pain, swelling, redness, or if otherwise concerned.    A follow up appointment will be scheduled to evaluate response to the injection, and to assess range of motion and pain.    Eleazar Barney DO CAQSM  Primary Care Sports Medicine       Large Joint Injection/Arthocentesis: bilateral knee    Date/Time: 2025 10:04 AM    Performed by: Eleazar Barney DO  Authorized by: Eleazar Barney DO     Indications:  Pain and osteoarthritis  Guidance: landmark guided    Location:  Knee  Laterality:  Bilateral      Medications (Right):  48 mg hylan 48 MG/6ML; 3 mL lidocaine 1 %  Medications (Left):  48 mg hylan 48 MG/6ML; 3 mL lidocaine 1 %  Outcome:  Tolerated well, no immediate complications  Procedure discussed: discussed risks, benefits, and alternatives    Consent Given by:  Patient  Timeout: timeout called immediately prior to procedure    Prep: patient was prepped and draped in usual sterile fashion

## 2025-04-30 NOTE — LETTER
2025      Padmini Alan  [address] [facility] [address]      Dear Colleague,    Thank you for referring your patient, Padmini Alan, to the [facility]. Please see a copy of my visit note below.    PROCEDURE ENCOUNTER    [facility]  Eleazar MONTAÑO Savita, DO  2025     Name: Padmini Alan  MRN: [id number]  Age: 65 year old  : 1960    Diagnosis:  Primary osteoarthritis of bilateral knee    Knee Injection Bilateral with Hyaluronic Acid    The patient was informed of the risks and the benefits of the procedure and a written consent was signed.    The patient s right knee was prepped with chlorhexidine in sterile fashion.     Local anesthesia was performed using a 27-gauge 1.5-inch needle to administer 3 mL of 1% lidocaine without epi.     Synvisc One syringe and medication removed from packaging and examined for package consistency.    Injection was performed using sterile technique.  Needle placement using landmark guidance at anterolateral aspect of right knee.      The patient s left knee was prepped with chlorhexidine in sterile fashion.     Local anesthesia was performed using a 27-gauge 1.5-inch needle to administer 3 mL of 1% lidocaine without epi.     Synvisc One syringe and medication removed from packaging and examined for package consistency.    Injection was performed using sterile technique.  Needle placement using landmark guidance at anterolateral aspect of left knee.    There were no complications. The patient tolerated the procedure well. There was negligible bleeding.     The patient was instructed to ice the knee upon leaving clinic and refrain from overuse over the next 3 days.     The patient was instructed to call or go to the emergency room with any unusual pain, swelling, redness, or if otherwise concerned.    A follow up appointment will be scheduled to evaluate response to the injection, and to  assess range of motion and pain.    Eleazar Barney DO CAQSM  Primary Care Sports Medicine       Large Joint Injection/Arthocentesis: bilateral knee    Date/Time: 4/30/2025 10:04 AM    Performed by: Eleazar Barney DO  Authorized by: Eleazar Barney DO    Indications:  Pain and osteoarthritis  Guidance: landmark guided    Location:  Knee  Laterality:  Bilateral      Medications (Right):  48 mg hylan 48 MG/6ML; 3 mL lidocaine 1 %  Medications (Left):  48 mg hylan 48 MG/6ML; 3 mL lidocaine 1 %  Outcome:  Tolerated well, no immediate complications  Procedure discussed: discussed risks, benefits, and alternatives    Consent Given by:  Patient  Timeout: timeout called immediately prior to procedure    Prep: patient was prepped and draped in usual sterile fashion             Again, thank you for allowing me to participate in the care of your patient.        Sincerely,        Eleazar Barney DO    Electronically signed

## 2025-05-07 ENCOUNTER — TELEPHONE (OUTPATIENT)
Dept: GASTROENTEROLOGY | Facility: CLINIC | Age: 65
End: 2025-05-07
Payer: COMMERCIAL

## 2025-05-07 NOTE — TELEPHONE ENCOUNTER
Pre visit planning completed.      Procedure details:    Patient scheduled for Upper endoscopy (EGD) on 5/21/25.     Arrival time: 0730. Procedure time 0815    Facility location: [facility]; [address] [place] [address] [place] [address]. Check in location: [facility].     Sedation type: Conscious sedation     Pre op exam needed? No.    Indication for procedure: GI bleed, anemia. Hx NSAIDS use.  Probable upper source      Chart review:     Electronic implanted devices? No    Recent diagnosis of diverticulitis within the last 6 weeks? No      Medication review:    Diabetic? No    Anticoagulants? No    Weight loss medication/injectable? No GLP-1 medication per patient's medication list. Nursing to verify with pre-assessment call.    Other medication HOLDING recommendations:  N/A      Prep for procedure:     Bowel prep recommendation: N/A  Due to: EGD    Procedure information and instructions sent via [employer] Wong RN  Endoscopy Procedure Pre Assessment   [phone] option 3

## 2025-05-07 NOTE — TELEPHONE ENCOUNTER
Pre assessment completed for upcoming procedure.   (Please see previous telephone encounter notes for complete details)    Patient returned call.       Procedure details:    Arrival time and facility location reviewed.    Pre op exam needed? No.    Designated  policy reviewed. Instructed to have someone stay 6  hours post procedure.       Medication review:    Medications reviewed. Please see supporting documentation below. Holding recommendations discussed (if applicable).       Prep for procedure:     Procedure prep instructions reviewed.        Any additional information needed:  N/A      Patient  verbalized understanding and had no questions or concerns at this time.      Corinne Kliber, RN  Endoscopy Procedure Pre Assessment   [phone] option 3

## 2025-05-07 NOTE — TELEPHONE ENCOUNTER
Attempted to contact patient in order to complete pre assessment questions.     No answer. Left message to return call to [phone] option 3.    Callback communication sent via [email or web address].    Anh Champion RN

## 2025-05-21 ENCOUNTER — HOSPITAL ENCOUNTER (OUTPATIENT)
Facility: CLINIC | Age: 65
Discharge: HOME | End: 2025-05-21
Attending: INTERNAL MEDICINE | Admitting: INTERNAL MEDICINE
Payer: COMMERCIAL

## 2025-05-21 VITALS
HEART RATE: 68 BPM | DIASTOLIC BLOOD PRESSURE: 79 MMHG | OXYGEN SATURATION: 98 % | SYSTOLIC BLOOD PRESSURE: 143 MMHG | RESPIRATION RATE: 12 BRPM

## 2025-05-21 LAB — UPPER GI ENDOSCOPY: NORMAL

## 2025-05-21 PROCEDURE — G0500 MOD SEDAT ENDO SERVICE >5YRS: HCPCS | Performed by: INTERNAL MEDICINE

## 2025-05-21 PROCEDURE — 250N000011 HC RX IP 250 OP 636: Performed by: INTERNAL MEDICINE

## 2025-05-21 PROCEDURE — 88305 TISSUE EXAM BY PATHOLOGIST: CPT | Mod: TC | Performed by: INTERNAL MEDICINE

## 2025-05-21 PROCEDURE — 88305 TISSUE EXAM BY PATHOLOGIST: CPT | Mod: 26 | Performed by: PATHOLOGY

## 2025-05-21 PROCEDURE — 43239 EGD BIOPSY SINGLE/MULTIPLE: CPT | Performed by: INTERNAL MEDICINE

## 2025-05-21 RX ORDER — FENTANYL CITRATE 50 UG/ML
INJECTION, SOLUTION INTRAMUSCULAR; INTRAVENOUS PRN
Status: DISCONTINUED | OUTPATIENT
Start: 2025-05-21 | End: 2025-05-21 | Stop reason: HOSPADM

## 2025-05-21 ASSESSMENT — ACTIVITIES OF DAILY LIVING (ADL)
ADLS_ACUITY_SCORE: 41
ADLS_ACUITY_SCORE: 41

## 2025-05-21 NOTE — H&P
[facility]  Pre-Endoscopy History and Physical     Padmini Alan MRN# [id number]   YOB: 1960 Age: 65 year old     Date of Procedure: 2025  Primary care provider: Maico Bell  Type of Endoscopy: esophagogastroduodenoscopy (upper GI endoscopy)  Reason for Procedure: EFREN; +fobt  Type of Anesthesia Anticipated: Moderate Sedation    HPI:    Padmini is a 65 year old female who will be undergoing the above procedure.      A history and physical has been performed. The patient's medications and allergies have been reviewed. The risks and benefits of the procedure and the sedation options and risks were discussed with the patient.  All questions were answered and informed consent was obtained.      She denies a personal or family history of anesthesia complications or bleeding disorders.     Allergies   Allergen Reactions    No Known Drug Allergy         Prior to Admission Medications   Prescriptions Last Dose Informant Patient Reported? Taking?   Elemental iron 65 mg Vitamin C 125 mg (VITRON C)  MG TABS tablet 2025  Yes Yes   Sig: Take 1 tablet by mouth daily.   LORazepam (ATIVAN) 0.5 MG tablet Unknown  No No   Si tab twice a day as needed for severe anxiety and with flying   amLODIPine-benazepril (LOTREL) 5-40 MG capsule 2025  No Yes   Sig: Take 1 capsule by mouth daily.   escitalopram (LEXAPRO) 20 MG tablet 2025  No Yes   Sig: Take 1 tablet (20 mg) by mouth daily.   fluticasone (FLONASE) 50 MCG/ACT nasal spray Unknown  No No   Sig: Spray 1-2 sprays into both nostrils daily   levothyroxine (SYNTHROID/LEVOTHROID) 125 MCG tablet 2025  No Yes   Sig: TAKE 1 TABLET BY MOUTH EVERY DAY IN THE MORNING   omeprazole (PRILOSEC) 20 MG DR capsule 2025  No Yes   Sig: Take 1 capsule (20 mg) by mouth daily.   vitamin D3 (CHOLECALCIFEROL) 50 mcg (2000 units) tablet 2025  No Yes   Sig: Take 1 tablet (50 mcg) by mouth daily      Facility-Administered  Medications: None       Patient Active Problem List   Diagnosis    Anxiety state    Other diseases of nasal cavity and sinuses    ACP (advance care planning)    Hypothyroidism, unspecified type    Morbid obesity, unspecified obesity type (H)    Female stress incontinence    Hyperparathyroidism    Hyperlipidemia LDL goal <100    Anemia, unspecified type    Hypertension, unspecified type    Stage 3a chronic kidney disease (H)    Osteoarthritis of both knees, unspecified osteoarthritis type        Past Medical History:   Diagnosis Date    Anemia, unspecified type 2025    Anxiety state 2003    Problem list name updated by automated process. Provider to review      Anxiety state, unspecified 2003    CHRONIC MAXILLARY SINUSITIS 2003    Female stress incontinence 2020    Hyperlipidemia LDL goal <100 2025    Hyperlipidemia LDL goal <130 10/25/2011    Hyperparathyroidism 2021    Hypertension, unspecified type 2025    Hypothyroidism, unspecified type 2016    LACTOSE INTOLERANCE, MILD     Morbid obesity, unspecified obesity type (H) 2017    Obesity, unspecified     Palpitations 1993    Stage 3a chronic kidney disease (H) 2025    Unspecified essential hypertension     Unspecified hypothyroidism         Past Surgical History:   Procedure Laterality Date    NO HISTORY OF SURGERY         Social History     Tobacco Use    Smoking status: Former     Current packs/day: 0.00     Types: Cigarettes     Quit date: 1987     Years since quittin.3    Smokeless tobacco: Never   Substance Use Topics    Alcohol use: Yes     Comment: socially       Family History   Problem Relation Age of Onset    Heart Disease Father         MI late 60s    Thyroid Disease Father     Cardiovascular Mother         palpitations       REVIEW OF SYSTEMS:     5 point ROS negative except as noted above in HPI, including Gen., Resp., CV, GI &  system review.      PHYSICAL EXAM:   LMP 2004  " Estimated body mass index is 45.4 kg/m  as calculated from the following:    Height as of 4/9/25: 1.6 m (5' 3\").    Weight as of 4/9/25: 116.3 kg (256 lb 4.8 oz).   GENERAL APPEARANCE: healthy, alert, and no distress  MENTAL STATUS: alert  AIRWAY EXAM: Mallampatti Class II (visualization of the soft palate, fauces, and uvula)  RESP: lungs clear to auscultation - no rales, rhonchi or wheezes  CV: regular rates and rhythm      DIAGNOSTICS:    Not indicated      IMPRESSION   ASA Class 3 - Severe systemic disease, but not incapacitating        PLAN:       Plan for esophagogastroduodenoscopy (upper GI endoscopy). We discussed the risks, benefits and alternatives and the patient wished to proceed.    The above has been forwarded to the consulting provider.      Signed Electronically by: Stanford Muniz MD  May 21, 2025    Stanford Muniz MD  [facility]  Cell: [phone]  Office Phone: [phone]  Office Fax: [phone]        "

## 2025-05-22 LAB
PATH REPORT.COMMENTS IMP SPEC: NORMAL
PATH REPORT.COMMENTS IMP SPEC: NORMAL
PATH REPORT.FINAL DX SPEC: NORMAL
PATH REPORT.GROSS SPEC: NORMAL
PATH REPORT.MICROSCOPIC SPEC OTHER STN: NORMAL
PATH REPORT.RELEVANT HX SPEC: NORMAL
PHOTO IMAGE: NORMAL

## 2025-06-19 ENCOUNTER — RESULTS FOLLOW-UP (OUTPATIENT)
Dept: INTERNAL MEDICINE | Facility: CLINIC | Age: 65
End: 2025-06-19

## 2025-06-21 DIAGNOSIS — K92.2 GASTROINTESTINAL HEMORRHAGE, UNSPECIFIED GASTROINTESTINAL HEMORRHAGE TYPE: ICD-10-CM

## 2025-06-23 RX ORDER — OMEPRAZOLE 20 MG/1
20 CAPSULE, DELAYED RELEASE ORAL DAILY
Qty: 30 CAPSULE | Refills: 10 | Status: SHIPPED | OUTPATIENT
Start: 2025-06-23

## 2025-07-01 ENCOUNTER — DOCUMENTATION ONLY (OUTPATIENT)
Dept: INTERNAL MEDICINE | Facility: CLINIC | Age: 65
End: 2025-07-01
Payer: COMMERCIAL

## 2025-07-01 DIAGNOSIS — D64.9 ANEMIA, UNSPECIFIED TYPE: ICD-10-CM

## 2025-07-01 DIAGNOSIS — E03.9 HYPOTHYROIDISM, UNSPECIFIED TYPE: Primary | ICD-10-CM

## 2025-07-01 DIAGNOSIS — N18.31 STAGE 3A CHRONIC KIDNEY DISEASE (H): ICD-10-CM

## 2025-07-01 NOTE — PROGRESS NOTES
Padmini Alan has an upcoming lab appointment:    Future Appointments   Date Time Provider Department Center   [facility]     Patient is scheduled for the following lab(s): Current labs are pending. Please sign or place new orders. Thank you     There is no order available. Please review and place either future orders or HMPO (Review of Health Maintenance Protocol Orders), as appropriate.    Health Maintenance Due   Topic    MICROALBUMIN     ANNUAL REVIEW OF HM ORDERS      Chip Castellanos

## 2025-07-03 ENCOUNTER — LAB (OUTPATIENT)
Dept: LAB | Facility: CLINIC | Age: 65
End: 2025-07-03
Payer: COMMERCIAL

## 2025-07-03 DIAGNOSIS — N18.31 STAGE 3A CHRONIC KIDNEY DISEASE (H): ICD-10-CM

## 2025-07-03 DIAGNOSIS — E03.9 HYPOTHYROIDISM, UNSPECIFIED TYPE: ICD-10-CM

## 2025-07-03 DIAGNOSIS — D64.9 ANEMIA, UNSPECIFIED TYPE: ICD-10-CM

## 2025-07-03 LAB
ANION GAP SERPL CALCULATED.3IONS-SCNC: 12 MMOL/L (ref 7–15)
BUN SERPL-MCNC: 14.1 MG/DL (ref 8–23)
CALCIUM SERPL-MCNC: 10.3 MG/DL (ref 8.8–10.4)
CHLORIDE SERPL-SCNC: 106 MMOL/L (ref 98–107)
CREAT SERPL-MCNC: 0.88 MG/DL (ref 0.51–0.95)
EGFRCR SERPLBLD CKD-EPI 2021: 72 ML/MIN/1.73M2
ERYTHROCYTE [DISTWIDTH] IN BLOOD BY AUTOMATED COUNT: 23.9 % (ref 10–15)
FERRITIN SERPL-MCNC: 22 NG/ML (ref 11–328)
GLUCOSE SERPL-MCNC: 103 MG/DL (ref 70–99)
HCO3 SERPL-SCNC: 22 MMOL/L (ref 22–29)
HCT VFR BLD AUTO: 39.6 % (ref 35–47)
HGB BLD-MCNC: 12.6 G/DL (ref 11.7–15.7)
IRON BINDING CAPACITY (ROCHE): 323 UG/DL (ref 240–430)
IRON SATN MFR SERPL: 60 % (ref 15–46)
IRON SERPL-MCNC: 195 UG/DL (ref 37–145)
MCH RBC QN AUTO: 25 PG (ref 26.5–33)
MCHC RBC AUTO-ENTMCNC: 31.8 G/DL (ref 31.5–36.5)
MCV RBC AUTO: 78 FL (ref 78–100)
PLATELET # BLD AUTO: 251 10E3/UL (ref 150–450)
POTASSIUM SERPL-SCNC: 4.5 MMOL/L (ref 3.4–5.3)
RBC # BLD AUTO: 5.05 10E6/UL (ref 3.8–5.2)
SODIUM SERPL-SCNC: 140 MMOL/L (ref 135–145)
TSH SERPL DL<=0.005 MIU/L-ACNC: 0.28 UIU/ML (ref 0.3–4.2)
WBC # BLD AUTO: 5.3 10E3/UL (ref 4–11)

## 2025-07-16 ENCOUNTER — ALLIED HEALTH/NURSE VISIT (OUTPATIENT)
Dept: INTERNAL MEDICINE | Facility: CLINIC | Age: 65
End: 2025-07-16

## 2025-07-16 VITALS — DIASTOLIC BLOOD PRESSURE: 78 MMHG | SYSTOLIC BLOOD PRESSURE: 118 MMHG

## 2025-07-16 DIAGNOSIS — Z01.30 BP CHECK: Primary | ICD-10-CM

## 2025-07-16 PROCEDURE — 99207 PR NO CHARGE NURSE ONLY: CPT | Performed by: INTERNAL MEDICINE

## 2025-07-16 NOTE — PROGRESS NOTES
Padmini Alan was evaluated at [facility] on July 16, 2025 at which time her blood pressure was:    BP Readings from Last 1 Encounters:   07/16/25 118/78     No data recorded      Reviewed lifestyle modifications for blood pressure control and reduction: including making healthy food choices, managing weight, getting regular exercise, smoking cessation, reducing alcohol consumption, monitoring blood pressure regularly.     Symptoms: None    BP Goal:< 140/90 mmHg    BP Assessment:  BP at goal    Potential Reasons for BP too high: NA - Not applicable    BP Follow-Up Plan: Recheck BP in 6 months at pharmacy    Recommendation to Provider: BP checked at pharmacy and noted to be at goal of <140/90.   Recommended patient continue current therapy and follow-up in 6 months at the pharmacy.       Note completed by: Jero Mendez RPH

## 2025-08-16 ENCOUNTER — MYC MEDICAL ADVICE (OUTPATIENT)
Dept: ORTHOPEDICS | Facility: CLINIC | Age: 65
End: 2025-08-16
Payer: COMMERCIAL

## 2025-08-21 DIAGNOSIS — M17.0 BILATERAL PRIMARY OSTEOARTHRITIS OF KNEE: Primary | ICD-10-CM

## 2025-08-25 ENCOUNTER — PATIENT OUTREACH (OUTPATIENT)
Dept: CARE COORDINATION | Facility: CLINIC | Age: 65
End: 2025-08-25
Payer: COMMERCIAL

## 2025-08-25 ENCOUNTER — OFFICE VISIT (OUTPATIENT)
Dept: ORTHOPEDICS | Facility: CLINIC | Age: 65
End: 2025-08-25
Attending: FAMILY MEDICINE
Payer: COMMERCIAL

## 2025-08-25 VITALS — HEIGHT: 63 IN | BODY MASS INDEX: 45.4 KG/M2

## 2025-08-25 DIAGNOSIS — M17.0 BILATERAL PRIMARY OSTEOARTHRITIS OF KNEE: ICD-10-CM

## 2025-08-25 PROCEDURE — 99204 OFFICE O/P NEW MOD 45 MIN: CPT | Performed by: STUDENT IN AN ORGANIZED HEALTH CARE EDUCATION/TRAINING PROGRAM

## 2025-08-26 ENCOUNTER — PATIENT OUTREACH (OUTPATIENT)
Dept: CARE COORDINATION | Facility: CLINIC | Age: 65
End: 2025-08-26
Payer: COMMERCIAL

## 2025-08-28 ENCOUNTER — PATIENT OUTREACH (OUTPATIENT)
Dept: CARE COORDINATION | Facility: CLINIC | Age: 65
End: 2025-08-28
Payer: COMMERCIAL

## 2025-09-01 ASSESSMENT — SLEEP AND FATIGUE QUESTIONNAIRES
HOW LIKELY ARE YOU TO NOD OFF OR FALL ASLEEP WHILE SITTING INACTIVE IN A PUBLIC PLACE: WOULD NEVER DOZE
HOW LIKELY ARE YOU TO NOD OFF OR FALL ASLEEP WHILE SITTING QUIETLY AFTER LUNCH WITHOUT ALCOHOL: WOULD NEVER DOZE
HOW LIKELY ARE YOU TO NOD OFF OR FALL ASLEEP WHILE LYING DOWN TO REST IN THE AFTERNOON WHEN CIRCUMSTANCES PERMIT: SLIGHT CHANCE OF DOZING
HOW LIKELY ARE YOU TO NOD OFF OR FALL ASLEEP WHILE WATCHING TV: WOULD NEVER DOZE
HOW LIKELY ARE YOU TO NOD OFF OR FALL ASLEEP WHILE SITTING AND TALKING TO SOMEONE: WOULD NEVER DOZE
HOW LIKELY ARE YOU TO NOD OFF OR FALL ASLEEP WHILE SITTING AND READING: WOULD NEVER DOZE
HOW LIKELY ARE YOU TO NOD OFF OR FALL ASLEEP IN A CAR, WHILE STOPPED FOR A FEW MINUTES IN TRAFFIC: WOULD NEVER DOZE
HOW LIKELY ARE YOU TO NOD OFF OR FALL ASLEEP WHEN YOU ARE A PASSENGER IN A CAR FOR AN HOUR WITHOUT A BREAK: WOULD NEVER DOZE

## 2025-09-02 ENCOUNTER — VIRTUAL VISIT (OUTPATIENT)
Dept: SURGERY | Facility: CLINIC | Age: 65
End: 2025-09-02
Payer: COMMERCIAL

## 2025-09-02 VITALS — HEIGHT: 63 IN | WEIGHT: 255 LBS | BODY MASS INDEX: 45.18 KG/M2

## 2025-09-02 DIAGNOSIS — I10 HYPERTENSION, UNSPECIFIED TYPE: ICD-10-CM

## 2025-09-02 DIAGNOSIS — E78.5 HYPERLIPIDEMIA LDL GOAL <100: ICD-10-CM

## 2025-09-02 DIAGNOSIS — E66.01 MORBID OBESITY WITH BMI OF 45.0-49.9, ADULT (H): Primary | ICD-10-CM

## 2025-09-02 DIAGNOSIS — M17.0 BILATERAL PRIMARY OSTEOARTHRITIS OF KNEE: ICD-10-CM

## 2025-09-02 PROCEDURE — 97802 MEDICAL NUTRITION INDIV IN: CPT | Performed by: DIETITIAN, REGISTERED

## 2025-09-02 PROCEDURE — 98003 SYNCH AUDIO-VIDEO NEW HI 60: CPT | Performed by: PHYSICIAN ASSISTANT

## (undated) RX ORDER — FENTANYL CITRATE 50 UG/ML
INJECTION, SOLUTION INTRAMUSCULAR; INTRAVENOUS
Status: DISPENSED
Start: 2025-05-21